# Patient Record
Sex: FEMALE | Race: WHITE | Employment: PART TIME | URBAN - NONMETROPOLITAN AREA
[De-identification: names, ages, dates, MRNs, and addresses within clinical notes are randomized per-mention and may not be internally consistent; named-entity substitution may affect disease eponyms.]

---

## 2016-06-22 LAB — PHQ9 SCORE: 16

## 2016-08-25 LAB — PHQ9 SCORE: 5

## 2017-02-16 ENCOUNTER — TELEPHONE (OUTPATIENT)
Dept: FAMILY MEDICINE | Facility: OTHER | Age: 22
End: 2017-02-16

## 2017-02-16 NOTE — TELEPHONE ENCOUNTER
Reason for Call:  Same Day Appointment, Requested Provider:  Evelyn Lang CNP    PCP: Evelyn Lang    Reason for visit: preop DOS 3/10/17 - dental care under anesthesia.  The dental office called said they need to have it 3 weeks ahead of time and Evelyn is out all next week    Duration of symptoms: n/a    Have you been treated for this in the past? n/a    Additional comments:     Can we leave a detailed message on this number? It is the house phone at the group [facility], ok to speak with any of the staff or can call [employer]'s cell at [phone]    Phone number patient can be reached at: Home number on file [phone] (home)    Best Time:     Call taken on 2/16/2017 at 2:29 PM by Delfina Guerra

## 2017-02-17 ENCOUNTER — OFFICE VISIT (OUTPATIENT)
Dept: FAMILY MEDICINE | Facility: OTHER | Age: 22
End: 2017-02-17
Payer: COMMERCIAL

## 2017-02-17 VITALS
WEIGHT: 293 LBS | HEART RATE: 111 BPM | SYSTOLIC BLOOD PRESSURE: 106 MMHG | DIASTOLIC BLOOD PRESSURE: 64 MMHG | OXYGEN SATURATION: 100 % | TEMPERATURE: 97.3 F | BODY MASS INDEX: 44.41 KG/M2 | HEIGHT: 68 IN | RESPIRATION RATE: 20 BRPM

## 2017-02-17 DIAGNOSIS — K02.9 DENTAL CARIES: ICD-10-CM

## 2017-02-17 DIAGNOSIS — Z01.818 PREOP GENERAL PHYSICAL EXAM: Primary | ICD-10-CM

## 2017-02-17 PROCEDURE — 99213 OFFICE O/P EST LOW 20 MIN: CPT | Performed by: NURSE PRACTITIONER

## 2017-02-17 RX ORDER — HYDROXYZINE PAMOATE 50 MG/1
50 CAPSULE ORAL 3 TIMES DAILY PRN
COMMUNITY
Start: 2017-02-17 | End: 2017-04-05

## 2017-02-17 NOTE — MR AVS SNAPSHOT
After Visit Summary   2/17/2017    Shaylee Mesa    MRN: [id number]           Patient Information     Date Of Birth          1995        Visit Information        Provider Department      2/17/2017 11:40 AM Evelyn Lang APRN CNP [facility]        Today's Diagnoses     Preop general physical exam    -  1    Dental caries          Care Instructions      Don't take any Ibuprofen, Aspirin, Aleve or Naproxen prior to surgery  Tylenol is fine to use.     Before Your Surgery      Call your surgeon if there is any change in your health. This includes signs of a cold or flu (such as a sore throat, runny nose, cough, rash or fever).    Do not smoke, drink alcohol or take over the counter medicine (unless your surgeon or primary care doctor tells you to) for the 24 hours before and after surgery.    If you take prescribed drugs: Follow your doctor s orders about which medicines to take and which to stop until after surgery.    Eating and drinking prior to surgery: follow the instructions from your surgeon    Take a shower or bath the night before surgery. Use the soap your surgeon gave you to gently clean your skin. If you do not have soap from your surgeon, use your regular soap. Do not shave or scrub the surgery site.  Wear clean pajamas and have clean sheets on your bed.         Follow-ups after your visit        Your next 10 appointments already scheduled     Mar 10, 2017   Procedure with Lea Hopson DDS   [facility], [place], Same Day Surgery (--)    [address] [facility] [address] [phone]              Who to contact     If you have questions or need follow up information about today's clinic visit or your schedule please contact [facility] directly at [phone].  Normal or non-critical lab and imaging results will be communicated to you by MyChart, letter or phone within 4 business days after the clinic has received the results. If you do not hear  "from us within 7 days, please contact the clinic through [employer] or phone. If you have a critical or abnormal lab result, we will notify you by phone as soon as possible.  Submit refill requests through [employer] or call your pharmacy and they will forward the refill request to us. Please allow 3 business days for your refill to be completed.          Additional Information About Your Visit        [email or web address] Information     [employer] lets you send messages to your doctor, view your test results, renew your prescriptions, schedule appointments and more. To sign up, go to [email or web address] . Click on \"Log in\" on the left side of the screen, which will take you to the Welcome page. Then click on \"Sign up Now\" on the right side of the page.     You will be asked to enter the access code listed below, as well as some personal information. Please follow the directions to create your username and password.     Your access code is: [email or web address]  Expires: 2017 11:59 AM     Your access code will  in 90 days. If you need help or a new code, please call your [facility] clinic or [phone].        Care EveryWhere ID     This is your Care EveryWhere ID. This could be used by other organizations to access your [facility] medical records  [id number]        Your Vitals Were     Pulse Temperature Respirations Height Pulse Oximetry BMI (Body Mass Index)    111 97.3  F (36.3  C) (Tympanic) 20 5' 8.2\" (1.732 m) 100% 45.65 kg/m2       Blood Pressure from Last 3 Encounters:   17 106/64   10/20/16 115/58   10/15/16 125/83    Weight from Last 3 Encounters:   17 (!) 302 lb (137 kg)   10/18/16 (!) 303 lb 8 oz (137.7 kg)   10/14/16 (!) 307 lb (139.3 kg)              Today, you had the following     No orders found for display       Primary Care Provider Office Phone # Fax #    DONN Marsh [phone]       [place] [address] [facility] [address]        Thank you!     Thank " you for choosing [facility]  for your care. Our goal is always to provide you with excellent care. Hearing back from our patients is one way we can continue to improve our services. Please take a few minutes to complete the written survey that you may receive in the mail after your visit with us. Thank you!             Your Updated Medication List - Protect others around you: Learn how to safely use, store and throw away your medicines at www.disposemymeds.org.          This list is accurate as of: 2/17/17 11:59 AM.  Always use your most recent med list.                   Brand Name Dispense Instructions for use    ARIPiprazole 20 MG tablet    ABILIFY    30 tablet    Take 1 tablet (20 mg) by mouth daily       hydrOXYzine 50 MG capsule    VISTARIL     Take 1 capsule (50 mg) by mouth 3 times daily as needed for itching       VITAMIN D (CHOLECALCIFEROL) PO      Take by mouth daily       VYVANSE 60 MG capsule   Generic drug:  lisdexamfetamine      TAKE 1 CAP BY MOUTH ONCE DAILY

## 2017-02-17 NOTE — NURSING NOTE
"Chief Complaint   Patient presents with     Pre-Op Exam     dental procedure dos: 3/10/17 0730       Initial /64 (BP Location: Left arm, Cuff Size: Adult Large)  Pulse 111  Temp 97.3  F (36.3  C) (Tympanic)  Resp 20  Ht 5' 8.2\" (1.732 m)  Wt (!) 302 lb (137 kg)  SpO2 100%  BMI 45.65 kg/m2 Estimated body mass index is 45.65 kg/(m^2) as calculated from the following:    Height as of this encounter: 5' 8.2\" (1.732 m).    Weight as of this encounter: 302 lb (137 kg).  Medication Reconciliation: complete   ................Aric Jacobo LPN,   February 17, 2017,      11:49 AM,   [facility]    "

## 2017-02-17 NOTE — PATIENT INSTRUCTIONS
Don't take any Ibuprofen, Aspirin, Aleve or Naproxen prior to surgery  Tylenol is fine to use.     Before Your Surgery      Call your surgeon if there is any change in your health. This includes signs of a cold or flu (such as a sore throat, runny nose, cough, rash or fever).    Do not smoke, drink alcohol or take over the counter medicine (unless your surgeon or primary care doctor tells you to) for the 24 hours before and after surgery.    If you take prescribed drugs: Follow your doctor s orders about which medicines to take and which to stop until after surgery.    Eating and drinking prior to surgery: follow the instructions from your surgeon    Take a shower or bath the night before surgery. Use the soap your surgeon gave you to gently clean your skin. If you do not have soap from your surgeon, use your regular soap. Do not shave or scrub the surgery site.  Wear clean pajamas and have clean sheets on your bed.

## 2017-02-17 NOTE — PROGRESS NOTES
[place] [address] [address] [facility] [address]  [phone]  Dept: [phone]    PRE-OP EVALUATION:  Today's date: 2017    Shaylee Mesa (: 1995) presents for pre-operative evaluation assessment as requested by Dr. Hopson.  She requires evaluation and anesthesia risk assessment prior to undergoing surgery/procedure for treatment of dental work .  Proposed procedure: Dental procedures.    Date of Surgery/ Procedure: 3/10/17  Time of Surgery/ Procedure: 730  Hospital/Surgical Facility: to be determined   Primary Physician: Evelyn Lang  Type of Anesthesia Anticipated: General    Patient has a Health Care Directive or Living Will:  NO    1. NO - Do you have a history of heart attack, stroke, stent, bypass or surgery on an artery in the head, neck, heart or legs?  2. NO - Do you ever have any pain or discomfort in your chest?  3. NO - Do you have a history of  Heart Failure?  4. NO - Are you troubled by shortness of breath when: walking on the level, up a slight hill or at night?  5. NO - Do you currently have a cold, bronchitis or other respiratory infection?  6. YES - DO YOU HAVE A COUGH, SHORTNESS OF BREATH OR WHEEZING? Mild upper respiratory infection symptoms.   7. NO - Do you sometimes get pains in the calves of your legs when you walk?  8. NO - Do you or anyone in your family have previous history of blood clots?  9. NO - Do you or does anyone in your family have a serious bleeding problem such as prolonged bleeding following surgeries or cuts?  10. NO - Have you ever had problems with anemia or been told to take iron pills?  11. NO - Have you had any abnormal blood loss such as black, tarry or bloody stools, or abnormal vaginal bleeding?  12. NO - Have you ever had a blood transfusion?  13. NO - Have you or any of your relatives ever had problems with anesthesia?  14. NO - Do you have sleep apnea, excessive snoring or daytime drowsiness?  15. NO - Do you have any  prosthetic heart valves?  16. NO - Do you have prosthetic joints?  17. NO - Is there any chance that you may be pregnant?      HPI:                                                      Brief HPI related to upcoming procedure: cracked teeth.  Needs dental work with sedation       See problem list for active medical problems.  Problems all longstanding and stable, except as noted/documented.  See ROS for pertinent symptoms related to these conditions.                                                                                                  .    MEDICAL HISTORY:                                                      Patient Active Problem List    Diagnosis Date Noted     Depression 10/15/2016     Priority: Medium     Adjustment disorder with depressed mood 04/25/2016     Priority: Medium     Asperger's syndrome 04/25/2016     Priority: Medium     Attention deficit hyperactivity disorder (ADHD), combined type 04/25/2016     Priority: Medium     Bipolar affective disorder in remission (H) 04/25/2016     Priority: Medium      Past Medical History   Diagnosis Date     ADHD (attention deficit hyperactivity disorder)      Asperger's disorder      Bipolar 1 disorder (H)      Bipolar affective disorder (H)      Chemical dependency (H)      Depressive disorder      Past Surgical History   Procedure Laterality Date     No history of surgery       Current Outpatient Prescriptions   Medication Sig Dispense Refill     hydrOXYzine (VISTARIL) 50 MG capsule Take 1 capsule (50 mg) by mouth 3 times daily as needed for itching       ARIPiprazole (ABILIFY) 20 MG tablet Take 1 tablet (20 mg) by mouth daily 30 tablet 3     VYVANSE 60 MG capsule TAKE 1 CAP BY MOUTH ONCE DAILY  0     VITAMIN D, CHOLECALCIFEROL, PO Take by mouth daily       OTC products: None, except as noted above    No Known Allergies   Latex Allergy: NO    Social History   Substance Use Topics     Smoking status: Former Smoker     Smokeless tobacco: Never Used      "Alcohol use No      Comment: Nov 9, 2016     History   Drug Use No     Comment: Nov 9 2015       REVIEW OF SYSTEMS:                                                    C: NEGATIVE for fever, chills, change in weight  I: NEGATIVE for worrisome rashes, moles or lesions  E: NEGATIVE for vision changes or irritation  E/M: NEGATIVE for ear, mouth and throat problems  R: NEGATIVE for significant cough or SOB  B: NEGATIVE for masses, tenderness or discharge  CV: NEGATIVE for chest pain, palpitations or peripheral edema  GI: NEGATIVE for nausea, abdominal pain, heartburn, or change in bowel habits  : NEGATIVE for frequency, dysuria, or hematuria  M: NEGATIVE for significant arthralgias or myalgia  N: NEGATIVE for weakness, dizziness or paresthesias  E: NEGATIVE for temperature intolerance, skin/hair changes  H: NEGATIVE for bleeding problems  P: NEGATIVE for changes in mood or affect    EXAM:                                                    /64 (BP Location: Left arm, Cuff Size: Adult Large)  Pulse 111  Temp 97.3  F (36.3  C) (Tympanic)  Resp 20  Ht 5' 8.2\" (1.732 m)  Wt (!) 302 lb (137 kg)  SpO2 100%  BMI 45.65 kg/m2    GENERAL APPEARANCE: healthy, alert and no distress     EYES: EOMI,- PERRL     HENT: ear canals and TM's normal and nose and mouth without ulcers or lesions     NECK: no adenopathy, no asymmetry, masses, or scars and thyroid normal to palpation     RESP: lungs clear to auscultation - no rales, rhonchi or wheezes     CV: regular rates and rhythm, normal S1 S2, no S3 or S4 and no murmur, click or rub -     ABDOMEN:  soft, nontender, no HSM or masses and bowel sounds normal     : normal cervix, adnexae, and uterus without masses or discharge and rectal exam normal without masses-guaiac negative stool     MS: extremities normal- no gross deformities noted, no evidence of inflammation in joints, FROM in all extremities.     SKIN: no suspicious lesions or rashes     NEURO: Normal strength and tone, " sensory exam grossly normal, mentation intact and speech normal     PSYCH: mentation appears normal. and affect normal/bright     LYMPHATICS: No axillary, cervical, inguinal, or supraclavicular nodes    DIAGNOSTICS:                                                    No labs or EKG required for low risk surgery (cataract, skin procedure, breast biopsy, etc)    Recent Labs   Lab Test  06/28/16   0826  07/10/15   0925  03/27/15   0150   HGB  13.3  12.4  13.0   PLT  270  162  219   NA  139   --   139   POTASSIUM  4.0   --   3.9   CR  0.59   --   0.53        IMPRESSION:                                                    Diagnosis/reason for consult: dental work     The proposed surgical procedure is considered LOW risk.    REVISED CARDIAC RISK INDEX  The patient has the following serious cardiovascular risks for perioperative complications such as (MI, PE, VFib and 3  AV Block):  No serious cardiac risks  INTERPRETATION: 0 risks: Class I (very low risk - 0.4% complication rate)    The patient has the following additional risks for perioperative complications:  No identified additional risks      ICD-10-CM    1. Preop general physical exam Z01.818    2. Dental caries K02.9        RECOMMENDATIONS:                                                      --Consult hospital rounder / IM to assist post-op medical management    --Patient is to take all scheduled medications on the day of surgery EXCEPT for modifications listed below.    APPROVAL GIVEN to proceed with proposed procedure, without further diagnostic evaluation       Signed Electronically by: DONN Marsh CNP    Copy of this evaluation report is provided to requesting physician.    [facility] Preop Guidelines

## 2017-02-27 ENCOUNTER — TRANSFERRED RECORDS (OUTPATIENT)
Dept: HEALTH INFORMATION MANAGEMENT | Facility: CLINIC | Age: 22
End: 2017-02-27

## 2017-02-27 LAB — PHQ9 SCORE: 6

## 2017-04-05 ENCOUNTER — OFFICE VISIT (OUTPATIENT)
Dept: FAMILY MEDICINE | Facility: OTHER | Age: 22
End: 2017-04-05
Payer: COMMERCIAL

## 2017-04-05 ENCOUNTER — RADIANT APPOINTMENT (OUTPATIENT)
Dept: GENERAL RADIOLOGY | Facility: OTHER | Age: 22
End: 2017-04-05
Attending: NURSE PRACTITIONER

## 2017-04-05 VITALS
OXYGEN SATURATION: 100 % | WEIGHT: 293 LBS | HEIGHT: 68 IN | DIASTOLIC BLOOD PRESSURE: 70 MMHG | TEMPERATURE: 97 F | BODY MASS INDEX: 44.41 KG/M2 | SYSTOLIC BLOOD PRESSURE: 100 MMHG | HEART RATE: 101 BPM | RESPIRATION RATE: 20 BRPM

## 2017-04-05 DIAGNOSIS — Z01.818 PREOP GENERAL PHYSICAL EXAM: ICD-10-CM

## 2017-04-05 DIAGNOSIS — K02.9 DENTAL CARIES: ICD-10-CM

## 2017-04-05 DIAGNOSIS — Z01.818 PREOP GENERAL PHYSICAL EXAM: Primary | ICD-10-CM

## 2017-04-05 PROCEDURE — 71020 XR CHEST 2 VW: CPT

## 2017-04-05 PROCEDURE — 93000 ELECTROCARDIOGRAM COMPLETE: CPT | Performed by: NURSE PRACTITIONER

## 2017-04-05 PROCEDURE — 99214 OFFICE O/P EST MOD 30 MIN: CPT | Performed by: NURSE PRACTITIONER

## 2017-04-05 NOTE — PROGRESS NOTES
[place] [address] [address] [facility] [address]  [phone]  Dept: [phone]    PRE-OP EVALUATION:  Today's date: 2017    Shaylee Mesa (: 1995) presents for pre-operative evaluation assessment as requested by the [facility]  She requires evaluation and anesthesia risk assessment prior to undergoing surgery/procedure for treatment of dental work .  Proposed procedure: dental procedure.    Date of Surgery/ Procedure: 17  Time of Surgery/ Procedure: 730  Hospital/Surgical Facility: [facility]  Primary Physician: Evelyn Lang  Type of Anesthesia Anticipated: General    Patient has a Health Care Directive or Living Will:  NO    1. NO - Do you have a history of heart attack, stroke, stent, bypass or surgery on an artery in the head, neck, heart or legs?  2. NO - Do you ever have any pain or discomfort in your chest?  3. NO - Do you have a history of  Heart Failure?  4. NO - Are you troubled by shortness of breath when: walking on the level, up a slight hill or at night?  5. NO - Do you currently have a cold, bronchitis or other respiratory infection?  6. NO - Do you have a cough, shortness of breath or wheezing?  7. no - DO YOU SOMETIMES GET PAINS IN THE CALVES OF YOUR LEGS WHEN YOU WALK?   8. NO - Do you or anyone in your family have previous history of blood clots?  9. NO - Do you or does anyone in your family have a serious bleeding problem such as prolonged bleeding following surgeries or cuts?  10. NO - Have you ever had problems with anemia or been told to take iron pills?  11. NO - Have you had any abnormal blood loss such as black, tarry or bloody stools, or abnormal vaginal bleeding?  12. NO - Have you ever had a blood transfusion?  13. NO - Have you or any of your relatives ever had problems with anesthesia?  14. NO - Do you have sleep apnea, excessive snoring or daytime drowsiness?  15. NO - Do you have any prosthetic heart  valves?  16. NO - Do you have prosthetic joints?  17. NO - Is there any chance that you may be pregnant?      HPI:                                                      Brief HPI related to upcoming procedure: need dental work with sedation.  [facility] from the [facility] that details what pre-op labs and imaging they require.       See problem list for active medical problems.  Problems all longstanding and stable, except as noted/documented.  See ROS for pertinent symptoms related to these conditions.                                                                                                  .    MEDICAL HISTORY:                                                      Patient Active Problem List    Diagnosis Date Noted     Depression 10/15/2016     Priority: Medium     Adjustment disorder with depressed mood 04/25/2016     Priority: Medium     Asperger's syndrome 04/25/2016     Priority: Medium     Attention deficit hyperactivity disorder (ADHD), combined type 04/25/2016     Priority: Medium     Bipolar affective disorder in remission (H) 04/25/2016     Priority: Medium      Past Medical History:   Diagnosis Date     ADHD (attention deficit hyperactivity disorder)      Asperger's disorder      Bipolar 1 disorder (H)      Bipolar affective disorder (H)      Chemical dependency (H)      Depressive disorder      Past Surgical History:   Procedure Laterality Date     NO HISTORY OF SURGERY       Current Outpatient Prescriptions   Medication Sig Dispense Refill     ARIPiprazole (ABILIFY) 20 MG tablet Take 1 tablet (20 mg) by mouth daily 30 tablet 3     VYVANSE 60 MG capsule TAKE 1 CAP BY MOUTH ONCE DAILY  0     VITAMIN D, CHOLECALCIFEROL, PO Take by mouth daily       OTC products: None, except as noted above    No Known Allergies   Latex Allergy: NO    Social History   Substance Use Topics     Smoking status: Former Smoker     Smokeless tobacco: Never Used     Alcohol use No      Comment: Nov 9, 2016  "    History   Drug Use No     Comment: Nov 9 2015       REVIEW OF SYSTEMS:                                                    C: NEGATIVE for fever, chills, change in weight  I: NEGATIVE for worrisome rashes, moles or lesions  E: NEGATIVE for vision changes or irritation  E/M: NEGATIVE for ear, mouth and throat problems  R: NEGATIVE for significant cough or SOB  B: NEGATIVE for masses, tenderness or discharge  CV: NEGATIVE for chest pain, palpitations or peripheral edema  GI: NEGATIVE for nausea, abdominal pain, heartburn, or change in bowel habits  : NEGATIVE for frequency, dysuria, or hematuria  M: NEGATIVE for significant arthralgias or myalgia  N: NEGATIVE for weakness, dizziness or paresthesias  E: NEGATIVE for temperature intolerance, skin/hair changes  H: NEGATIVE for bleeding problems  P: NEGATIVE for changes in mood or affect    EXAM:                                                    /70  Pulse 101  Temp 97  F (36.1  C) (Tympanic)  Resp 20  Ht 5' 8.2\" (1.732 m)  Wt (!) 332 lb (150.6 kg)  LMP  (LMP Unknown)  SpO2 100%  BMI 50.18 kg/m2    GENERAL APPEARANCE: alert, no distress and obese      EYES: EOMI, PERRL     HENT: ear canals and TM's normal and nose and mouth without ulcers or lesions     NECK: no adenopathy, no asymmetry, masses, or scars and thyroid normal to palpation     RESP: lungs clear to auscultation - no rales, rhonchi or wheezes     CV: regular rates and rhythm, normal S1 S2, no S3 or S4 and no murmur, click or rub     ABDOMEN:  soft, nontender, no HSM or masses and bowel sounds normal     MS: extremities normal- no gross deformities noted, no evidence of inflammation in joints, FROM in all extremities.     SKIN: no suspicious lesions or rashes     NEURO: Normal strength and tone, sensory exam grossly normal, mentation intact and speech normal     PSYCH: mentation appears normal. and affect normal/bright     LYMPHATICS: No axillary, cervical, or supraclavicular " nodes    DIAGNOSTICS:                                                    EKG: appears normal, NSR, normal axis, normal intervals, no acute ST/T changes c/w ischemia, no LVH by voltage criteria, there are no prior tracings available  She declined having the requested labs done today, states she will come back later this week and future lab orders were placed for this.  These labs were requested by the dental surgeon.   Chest x-ray: negative     IMPRESSION:                                                    Diagnosis/reason for consult: dental work needed with sedation     The proposed surgical procedure is considered LOW risk.    REVISED CARDIAC RISK INDEX  The patient has the following serious cardiovascular risks for perioperative complications such as (MI, PE, VFib and 3  AV Block):  No serious cardiac risks  INTERPRETATION: 0 risks: Class I (very low risk - 0.4% complication rate)    The patient has the following additional risks for perioperative complications:  No identified additional risks      ICD-10-CM    1. Preop general physical exam Z01.818 EKG 12-lead complete w/read - Clinics     XR Chest 2 Views     CBC with platelets     INR     CANCELED: CBC with platelets     CANCELED: INR   2. Dental caries K02.9        RECOMMENDATIONS:                                                      --Consult hospital rounder / IM to assist post-op medical management    --Patient is to take all scheduled medications on the day of surgery EXCEPT for modifications listed below.    APPROVAL GIVEN to proceed with proposed procedure, without further diagnostic evaluation       Signed Electronically by: DONN Marsh CNP    Copy of this evaluation report is provided to requesting physician.    [facility] Preop Guidelines

## 2017-04-05 NOTE — NURSING NOTE
"Chief Complaint   Patient presents with     Pre-Op Exam     Dental Exam Under Anesthesia, X-Rays, Restorations, Extractions, Biopsies    4/28/2017     Health Maintenance     HPV,DTAP and Pap Smear       Initial /70  Pulse 101  Temp 97  F (36.1  C) (Tympanic)  Resp 20  Ht 5' 8.2\" (1.732 m)  Wt (!) 332 lb (150.6 kg)  LMP  (LMP Unknown)  SpO2 100%  BMI 50.18 kg/m2 Estimated body mass index is 50.18 kg/(m^2) as calculated from the following:    Height as of this encounter: 5' 8.2\" (1.732 m).    Weight as of this encounter: 332 lb (150.6 kg).  Medication Reconciliation: complete   ................Aric Jacobo LPN,   April 5, 2017,      4:37 PM,   [facility]     "

## 2017-04-05 NOTE — PATIENT INSTRUCTIONS
Don't take any Ibuprofen, Naproxen, Aspirin or Aleve for 1 week prior to the procedure.       Before Your Surgery      Call your surgeon if there is any change in your health. This includes signs of a cold or flu (such as a sore throat, runny nose, cough, rash or fever).    Do not smoke, drink alcohol or take over the counter medicine (unless your surgeon or primary care doctor tells you to) for the 24 hours before and after surgery.    If you take prescribed drugs: Follow your doctor s orders about which medicines to take and which to stop until after surgery.    Eating and drinking prior to surgery: follow the instructions from your surgeon    Take a shower or bath the night before surgery. Use the soap your surgeon gave you to gently clean your skin. If you do not have soap from your surgeon, use your regular soap. Do not shave or scrub the surgery site.  Wear clean pajamas and have clean sheets on your bed.

## 2017-04-05 NOTE — MR AVS SNAPSHOT
After Visit Summary   4/5/2017    Shaylee Mesa    MRN: [id number]           Patient Information     Date Of Birth          1995        Visit Information        Provider Department      4/5/2017 4:20 PM Evelyn Lang APRN CNP [facility]        Today's Diagnoses     Preop general physical exam    -  1    Dental caries          Care Instructions      Don't take any Ibuprofen, Naproxen, Aspirin or Aleve for 1 week prior to the procedure.       Before Your Surgery      Call your surgeon if there is any change in your health. This includes signs of a cold or flu (such as a sore throat, runny nose, cough, rash or fever).    Do not smoke, drink alcohol or take over the counter medicine (unless your surgeon or primary care doctor tells you to) for the 24 hours before and after surgery.    If you take prescribed drugs: Follow your doctor s orders about which medicines to take and which to stop until after surgery.    Eating and drinking prior to surgery: follow the instructions from your surgeon    Take a shower or bath the night before surgery. Use the soap your surgeon gave you to gently clean your skin. If you do not have soap from your surgeon, use your regular soap. Do not shave or scrub the surgery site.  Wear clean pajamas and have clean sheets on your bed.         Follow-ups after your visit        Your next 10 appointments already scheduled     Apr 28, 2017   Procedure with Lea Hopson DDS   [facility], [place], Same Day Surgery (--)    [address] [facility] [address] [phone]              Future tests that were ordered for you today     Open Future Orders        Priority Expected Expires Ordered    XR Chest 2 Views Routine 4/5/2017 4/5/2018 4/5/2017            Who to contact     If you have questions or need follow up information about today's clinic visit or your schedule please contact [facility] directly at [phone].  Normal or non-critical  "lab and imaging results will be communicated to you by MyChart, letter or phone within 4 business days after the clinic has received the results. If you do not hear from us within 7 days, please contact the clinic through [employer] or phone. If you have a critical or abnormal lab result, we will notify you by phone as soon as possible.  Submit refill requests through [employer] or call your pharmacy and they will forward the refill request to us. Please allow 3 business days for your refill to be completed.          Additional Information About Your Visit        [employer] Information     Sky[employer] lets you send messages to your doctor, view your test results, renew your prescriptions, schedule appointments and more. To sign up, go to [email or web address] . Click on \"Log in\" on the left side of the screen, which will take you to the Welcome page. Then click on \"Sign up Now\" on the right side of the page.     You will be asked to enter the access code listed below, as well as some personal information. Please follow the directions to create your username and password.     Your access code is: [email or web address]  Expires: 2017 12:59 PM     Your access code will  in 90 days. If you need help or a new code, please call your [place] clinic or [phone].        Care EveryWhere ID     This is your Care EveryWhere ID. This could be used by other organizations to access your [place] medical records  [id number]        Your Vitals Were     Pulse Temperature Respirations Height Last Period Pulse Oximetry    101 97  F (36.1  C) (Tympanic) 20 5' 8.2\" (1.732 m) (LMP Unknown) 100%    BMI (Body Mass Index)                   50.18 kg/m2            Blood Pressure from Last 3 Encounters:   17 100/70   17 106/64   10/20/16 115/58    Weight from Last 3 Encounters:   17 (!) 332 lb (150.6 kg)   17 (!) 302 lb (137 kg)   10/18/16 (!) 303 lb 8 oz (137.7 kg)              We Performed the Following     CBC with " platelets     EKG 12-lead complete w/read - Clinics     INR        Primary Care Provider Office Phone # Fax #    DONN Marsh [phone]       [facility] [address] [facility] [address]        Thank you!     Thank you for choosing [facility]  for your care. Our goal is always to provide you with excellent care. Hearing back from our patients is one way we can continue to improve our services. Please take a few minutes to complete the written survey that you may receive in the mail after your visit with us. Thank you!             Your Updated Medication List - Protect others around you: Learn how to safely use, store and throw away your medicines at www.disposemymeds.org.          This list is accurate as of: 4/5/17  5:07 PM.  Always use your most recent med list.                   Brand Name Dispense Instructions for use    ARIPiprazole 20 MG tablet    ABILIFY    30 tablet    Take 1 tablet (20 mg) by mouth daily       VITAMIN D (CHOLECALCIFEROL) PO      Take by mouth daily       VYVANSE 60 MG capsule   Generic drug:  lisdexamfetamine      TAKE 1 CAP BY MOUTH ONCE DAILY

## 2017-04-27 ENCOUNTER — ANESTHESIA EVENT (OUTPATIENT)
Dept: SURGERY | Facility: CLINIC | Age: 22
End: 2017-04-27
Payer: COMMERCIAL

## 2017-04-28 ENCOUNTER — HOSPITAL ENCOUNTER (OUTPATIENT)
Facility: CLINIC | Age: 22
Discharge: HOME | End: 2017-04-28
Attending: DENTIST | Admitting: DENTIST
Payer: COMMERCIAL

## 2017-04-28 ENCOUNTER — ANESTHESIA (OUTPATIENT)
Dept: SURGERY | Facility: CLINIC | Age: 22
End: 2017-04-28
Payer: COMMERCIAL

## 2017-04-28 VITALS
RESPIRATION RATE: 11 BRPM | WEIGHT: 293 LBS | HEIGHT: 68 IN | HEART RATE: 109 BPM | SYSTOLIC BLOOD PRESSURE: 97 MMHG | BODY MASS INDEX: 44.41 KG/M2 | TEMPERATURE: 98.6 F | DIASTOLIC BLOOD PRESSURE: 54 MMHG | OXYGEN SATURATION: 92 %

## 2017-04-28 DIAGNOSIS — K02.9 CARIES: Primary | ICD-10-CM

## 2017-04-28 LAB — HCG UR QL: NEGATIVE

## 2017-04-28 PROCEDURE — 25000125 ZZHC RX 250: Performed by: NURSE ANESTHETIST, CERTIFIED REGISTERED

## 2017-04-28 PROCEDURE — 36000053 ZZH SURGERY LEVEL 2 EA 15 ADDTL MIN - UMMC: Performed by: DENTIST

## 2017-04-28 PROCEDURE — 81025 URINE PREGNANCY TEST: CPT | Performed by: ANESTHESIOLOGY

## 2017-04-28 PROCEDURE — 25000132 ZZH RX MED GY IP 250 OP 250 PS 637: Performed by: DENTIST

## 2017-04-28 PROCEDURE — 71000014 ZZH RECOVERY PHASE 1 LEVEL 2 FIRST HR: Performed by: DENTIST

## 2017-04-28 PROCEDURE — 25000132 ZZH RX MED GY IP 250 OP 250 PS 637: Performed by: ANESTHESIOLOGY

## 2017-04-28 PROCEDURE — 71000027 ZZH RECOVERY PHASE 2 EACH 15 MINS: Performed by: DENTIST

## 2017-04-28 PROCEDURE — 25000128 H RX IP 250 OP 636: Performed by: ANESTHESIOLOGY

## 2017-04-28 PROCEDURE — 25000566 ZZH SEVOFLURANE, EA 15 MIN: Performed by: DENTIST

## 2017-04-28 PROCEDURE — 25800025 ZZH RX 258: Performed by: NURSE ANESTHETIST, CERTIFIED REGISTERED

## 2017-04-28 PROCEDURE — 25000128 H RX IP 250 OP 636: Performed by: NURSE ANESTHETIST, CERTIFIED REGISTERED

## 2017-04-28 PROCEDURE — 36000051 ZZH SURGERY LEVEL 2 1ST 30 MIN - UMMC: Performed by: DENTIST

## 2017-04-28 PROCEDURE — 40000171 ZZH STATISTIC PRE-PROCEDURE ASSESSMENT III: Performed by: DENTIST

## 2017-04-28 PROCEDURE — 25000565 ZZH ISOFLURANE, EA 15 MIN: Performed by: DENTIST

## 2017-04-28 PROCEDURE — 27210794 ZZH OR GENERAL SUPPLY STERILE: Performed by: DENTIST

## 2017-04-28 PROCEDURE — 37000008 ZZH ANESTHESIA TECHNICAL FEE, 1ST 30 MIN: Performed by: DENTIST

## 2017-04-28 PROCEDURE — 25000125 ZZHC RX 250: Performed by: ANESTHESIOLOGY

## 2017-04-28 PROCEDURE — 25000132 ZZH RX MED GY IP 250 OP 250 PS 637: Performed by: NURSE ANESTHETIST, CERTIFIED REGISTERED

## 2017-04-28 PROCEDURE — 37000009 ZZH ANESTHESIA TECHNICAL FEE, EACH ADDTL 15 MIN: Performed by: DENTIST

## 2017-04-28 RX ORDER — ONDANSETRON 2 MG/ML
INJECTION INTRAMUSCULAR; INTRAVENOUS PRN
Status: DISCONTINUED | OUTPATIENT
Start: 2017-04-28 | End: 2017-04-28

## 2017-04-28 RX ORDER — DEXAMETHASONE SODIUM PHOSPHATE 4 MG/ML
INJECTION, SOLUTION INTRA-ARTICULAR; INTRALESIONAL; INTRAMUSCULAR; INTRAVENOUS; SOFT TISSUE PRN
Status: DISCONTINUED | OUTPATIENT
Start: 2017-04-28 | End: 2017-04-28

## 2017-04-28 RX ORDER — CHLORHEXIDINE GLUCONATE ORAL RINSE 1.2 MG/ML
10 SOLUTION DENTAL ONCE
Status: COMPLETED | OUTPATIENT
Start: 2017-04-28 | End: 2017-04-28

## 2017-04-28 RX ORDER — SODIUM CHLORIDE, SODIUM LACTATE, POTASSIUM CHLORIDE, CALCIUM CHLORIDE 600; 310; 30; 20 MG/100ML; MG/100ML; MG/100ML; MG/100ML
INJECTION, SOLUTION INTRAVENOUS CONTINUOUS PRN
Status: DISCONTINUED | OUTPATIENT
Start: 2017-04-28 | End: 2017-04-28

## 2017-04-28 RX ORDER — FENTANYL CITRATE 50 UG/ML
INJECTION, SOLUTION INTRAMUSCULAR; INTRAVENOUS PRN
Status: DISCONTINUED | OUTPATIENT
Start: 2017-04-28 | End: 2017-04-28

## 2017-04-28 RX ORDER — IBUPROFEN 600 MG/1
600 TABLET, FILM COATED ORAL
Qty: 16 TABLET | Refills: 0 | Status: SHIPPED | OUTPATIENT
Start: 2017-05-01 | End: 2017-05-05

## 2017-04-28 RX ORDER — HYDROCODONE BITARTRATE AND ACETAMINOPHEN 5; 325 MG/1; MG/1
1 TABLET ORAL
Qty: 12 TABLET | Refills: 0 | Status: SHIPPED | OUTPATIENT
Start: 2017-04-28 | End: 2017-05-01

## 2017-04-28 RX ORDER — KETAMINE HYDROCHLORIDE 100 MG/ML
500 INJECTION, SOLUTION INTRAMUSCULAR; INTRAVENOUS ONCE
Status: COMPLETED | OUTPATIENT
Start: 2017-04-28 | End: 2017-04-28

## 2017-04-28 RX ORDER — CHLORHEXIDINE GLUCONATE ORAL RINSE 1.2 MG/ML
SOLUTION DENTAL PRN
Status: DISCONTINUED | OUTPATIENT
Start: 2017-04-28 | End: 2017-04-28 | Stop reason: HOSPADM

## 2017-04-28 RX ORDER — OXYMETAZOLINE HYDROCHLORIDE 0.05 G/100ML
SPRAY NASAL PRN
Status: DISCONTINUED | OUTPATIENT
Start: 2017-04-28 | End: 2017-04-28

## 2017-04-28 RX ORDER — ONDANSETRON 2 MG/ML
0.03 INJECTION INTRAMUSCULAR; INTRAVENOUS EVERY 30 MIN PRN
Status: DISCONTINUED | OUTPATIENT
Start: 2017-04-28 | End: 2017-04-28 | Stop reason: HOSPADM

## 2017-04-28 RX ORDER — KETOROLAC TROMETHAMINE 30 MG/ML
INJECTION, SOLUTION INTRAMUSCULAR; INTRAVENOUS PRN
Status: DISCONTINUED | OUTPATIENT
Start: 2017-04-28 | End: 2017-04-28

## 2017-04-28 RX ORDER — FENTANYL CITRATE 50 UG/ML
25 INJECTION, SOLUTION INTRAMUSCULAR; INTRAVENOUS EVERY 10 MIN PRN
Status: DISCONTINUED | OUTPATIENT
Start: 2017-04-28 | End: 2017-04-28 | Stop reason: HOSPADM

## 2017-04-28 RX ORDER — MIDAZOLAM HYDROCHLORIDE 2 MG/ML
20 SYRUP ORAL ONCE
Status: COMPLETED | OUTPATIENT
Start: 2017-04-28 | End: 2017-04-28

## 2017-04-28 RX ORDER — PROPOFOL 10 MG/ML
INJECTION, EMULSION INTRAVENOUS PRN
Status: DISCONTINUED | OUTPATIENT
Start: 2017-04-28 | End: 2017-04-28

## 2017-04-28 RX ADMIN — KETAMINE HYDROCHLORIDE 500 MG: 100 INJECTION, SOLUTION, CONCENTRATE INTRAMUSCULAR; INTRAVENOUS at 07:21

## 2017-04-28 RX ADMIN — HYDROMORPHONE HYDROCHLORIDE 0.5 MG: 1 INJECTION, SOLUTION INTRAMUSCULAR; INTRAVENOUS; SUBCUTANEOUS at 17:22

## 2017-04-28 RX ADMIN — SODIUM CHLORIDE, POTASSIUM CHLORIDE, SODIUM LACTATE AND CALCIUM CHLORIDE: 600; 310; 30; 20 INJECTION, SOLUTION INTRAVENOUS at 07:40

## 2017-04-28 RX ADMIN — HYDROMORPHONE HYDROCHLORIDE 0.25 MG: 1 INJECTION, SOLUTION INTRAMUSCULAR; INTRAVENOUS; SUBCUTANEOUS at 14:46

## 2017-04-28 RX ADMIN — ONDANSETRON 4 MG: 2 INJECTION INTRAMUSCULAR; INTRAVENOUS at 20:36

## 2017-04-28 RX ADMIN — Medication 2 SPRAY: at 07:43

## 2017-04-28 RX ADMIN — HYDROMORPHONE HYDROCHLORIDE 0.25 MG: 1 INJECTION, SOLUTION INTRAMUSCULAR; INTRAVENOUS; SUBCUTANEOUS at 11:05

## 2017-04-28 RX ADMIN — DEXAMETHASONE SODIUM PHOSPHATE 8 MG: 4 INJECTION, SOLUTION INTRAMUSCULAR; INTRAVENOUS at 07:58

## 2017-04-28 RX ADMIN — PROPOFOL 200 MG: 10 INJECTION, EMULSION INTRAVENOUS at 07:39

## 2017-04-28 RX ADMIN — HYDROMORPHONE HYDROCHLORIDE 0.25 MG: 1 INJECTION, SOLUTION INTRAMUSCULAR; INTRAVENOUS; SUBCUTANEOUS at 14:00

## 2017-04-28 RX ADMIN — SODIUM CHLORIDE, POTASSIUM CHLORIDE, SODIUM LACTATE AND CALCIUM CHLORIDE: 600; 310; 30; 20 INJECTION, SOLUTION INTRAVENOUS at 11:36

## 2017-04-28 RX ADMIN — DEXMEDETOMIDINE HYDROCHLORIDE 20 MCG: 100 INJECTION, SOLUTION INTRAVENOUS at 17:11

## 2017-04-28 RX ADMIN — FENTANYL CITRATE 150 MCG: 50 INJECTION, SOLUTION INTRAMUSCULAR; INTRAVENOUS at 07:39

## 2017-04-28 RX ADMIN — KETOROLAC TROMETHAMINE 30 MG: 30 INJECTION, SOLUTION INTRAMUSCULAR at 19:27

## 2017-04-28 RX ADMIN — FENTANYL CITRATE 25 MCG: 50 INJECTION, SOLUTION INTRAMUSCULAR; INTRAVENOUS at 19:47

## 2017-04-28 RX ADMIN — ONDANSETRON 4 MG: 2 INJECTION INTRAMUSCULAR; INTRAVENOUS at 19:19

## 2017-04-28 RX ADMIN — Medication 50 MG: at 07:39

## 2017-04-28 RX ADMIN — CHLORHEXIDINE GLUCONATE 10 ML: 1.2 RINSE ORAL at 07:11

## 2017-04-28 RX ADMIN — FENTANYL CITRATE 50 MCG: 50 INJECTION, SOLUTION INTRAMUSCULAR; INTRAVENOUS at 10:07

## 2017-04-28 RX ADMIN — MIDAZOLAM HYDROCHLORIDE 20 MG: 2 SYRUP ORAL at 07:21

## 2017-04-28 RX ADMIN — HYDROMORPHONE HYDROCHLORIDE 0.25 MG: 1 INJECTION, SOLUTION INTRAMUSCULAR; INTRAVENOUS; SUBCUTANEOUS at 12:47

## 2017-04-28 RX ADMIN — FENTANYL CITRATE 50 MCG: 50 INJECTION, SOLUTION INTRAMUSCULAR; INTRAVENOUS at 08:23

## 2017-04-28 NOTE — IP AVS SNAPSHOT
MRN:[id number]                      After Visit Summary   4/28/2017    Shaylee Msea    MRN: [id number]           Thank you!     Thank you for choosing [facility] for your care. Our goal is always to provide you with excellent care. Hearing back from our patients is one way we can continue to improve our services. Please take a few minutes to complete the written survey that you may receive in the mail after you visit with us. Thank you!        Patient Information     Date Of Birth          1995        About your hospital stay     You were admitted on:  April 28, 2017 You last received care in the:  [facility] PACU    You were discharged on:  April 28, 2017       Who to Call     For medical emergencies, please call 911.  For non-urgent questions about your medical care, please call your primary care provider or clinic, [phone]  For questions related to your surgery, please call your surgery clinic        Attending Provider     Provider Lea Abdalla DDS Diabetic Education       Primary Care Provider Office Phone # Fax #    DONN Marsh [phone]       [place] [address] [facility] [address]        After Care Instructions     Discharge Instructions       Dr. Hopson Post Op Instructions    Services Provided:   1.  Prophy/ Scaling & Root Planing      2. Comprehensive exam     3. Fluoride      4. Operative:  Fillings: #5, #7, #9, #10, #11, #12, #15, #22, #23, #24, #25, #26, #27, #28, #32 Extractions: #2, #31 Root Canals: #5, #24, #27     5. X-rays (full mouth series)    Oral Hygiene:     Slight, Moderate, Excessive       1. Plaque Moderate and Excessive      2. Calculus/Tartar Slight and Moderate      3. Inflammation & Bleeding Excessive      4. Overall Oral Hygiene Poor  Diagnosis:     1. Gingivitis     2. Periodontal Disease Early     3. Dental Diease Tooth #2, #5, #7, #9, #10, #11, #12, #15, #22-#28, #31-32     4. Other  findings  Orders:     1. Toothbrushing BID     2. Better brushing  Inside, Outside, Upper, Lower, Back and Front     3. Brushing Assistance Needed/Recommended      4. Do not brush or floss for 24 hours (resume routine care tomorrow)      5. Eat a soft diet and avoid hot drinks during post-op 24 hour period     6. Utilize an ice pack      7. Return to clinic in 2 weeks to evaluate bite and 3 months for follow up.           Return to OR in 2 to 5 years.     Post-Operative Instructions:    1.  X  Follow post-anesthesia instructions from the hospital.    2.  X  Follow post-extraction orders.  The pink brochure is attached.    3.  X  Tooth or teeth may be:     a. X  Hot or cold sensitive for 4-6 weeks   b. X  Tender to chewing for 24 hours (if persistent pain/swelling develops call the office)   c. X  If you had a root canal treatment, the tooth may be tender to biting for up to a week, this is normal and should resolve.    4. X  Soft diet for 4 days.  Encourage fluids.     5. X  Patient should return to routine activities/work as tolerated.    6. X Call with questions or concerns:   Office: ([phone])    Pager: Dr. Lea Hopson ([phone])                  Further instructions from your care team       [facility], [place]  Same-Day Surgery   Adult Discharge Orders & Instructions     For 24 hours after surgery    1. Get plenty of rest.  A responsible adult must stay with you for at least 24 hours after you leave the hospital.   2. Do not drive or use heavy equipment.  If you have weakness or tingling, don't drive or use heavy equipment until this feeling goes away.  3. Do not drink alcohol.  4. Avoid strenuous or risky activities.  Ask for help when climbing stairs.   5. You may feel lightheaded.  IF so, sit for a few minutes before standing.  Have someone help you get up.   6. If you have nausea (feel sick to your stomach): Drink only clear liquids such as apple juice, ginger ale,  "broth or 7-Up.  Rest may also help.  Be sure to drink enough fluids.  Move to a regular diet as you feel able.  7. You may have a slight fever. Call the doctor if your fever is over 100 F (37.7 C) (taken under the tongue) or lasts longer than 24 hours.  8. You may have a dry mouth, a sore throat, muscle aches or trouble sleeping.  These should go away after 24 hours.  9. Do not make important or legal decisions.   Call your doctor for any of the followin.  Signs of infection (fever, growing tenderness at the surgery site, a large amount of drainage or bleeding, severe pain, foul-smelling drainage, redness, swelling).    2. It has been over 8 to 10 hours since surgery and you are still not able to urinate (pass water).    3.  Headache for over 24 hours.    4.  Numbness, tingling or weakness the day after surgery (if you had spinal anesthesia).  To contact a doctor, call ________________________________________ or:        [phone] and ask for the resident on call for   ______________________________________________ (answered 24 hours a day)      Emergency Department:    [facility]: [phone]       (TTY for hearing impaired: [phone])    [facility]: [phone]       (TTY for hearing impaired: [phone])        Pending Results     No orders found from 2017 to 2017.            Admission Information     Date & Time Provider Department Dept. Phone    2017 Lea Hopson DDS [facility] PACU [phone]      Your Vitals Were     Blood Pressure Pulse Temperature Respirations Height Weight    97/54 109 98.6  F (37  C) (Axillary) 11 1.727 m (5' 8\") 152.8 kg (336 lb 13.8 oz)    Last Period Pulse Oximetry BMI (Body Mass Index)             2017 92% 51.22 kg/m2         [employer] Information     [employer] lets you send messages to your doctor, view your test results, renew your prescriptions, schedule appointments and more. To sign up, go to [email or web address] . Click on " "\"Log in\" on the left side of the screen, which will take you to the Welcome page. Then click on \"Sign up Now\" on the right side of the page.     You will be asked to enter the access code listed below, as well as some personal information. Please follow the directions to create your username and password.     Your access code is: [email or web address]  Expires: 2017 12:59 PM     Your access code will  in 90 days. If you need help or a new code, please call your [place] clinic or [phone].        Care EveryWhere ID     This is your Care EveryWhere ID. This could be used by other organizations to access your [facility] medical records  [id number]           Review of your medicines      START taking        Dose / Directions    HYDROcodone-acetaminophen 5-325 MG per tablet   Commonly known as:  NORCO   Used for:  Caries        Dose:  1 tablet   Take 1 tablet by mouth 4 times daily (before meals and nightly) for 3 days   Quantity:  12 tablet   Refills:  0       ibuprofen 600 MG tablet   Commonly known as:  ADVIL/MOTRIN   Used for:  Caries        Dose:  600 mg   Start taking on:  2017   Take 1 tablet (600 mg) by mouth 4 times daily (with meals and nightly) for 4 days   Quantity:  16 tablet   Refills:  0       Sodium Fluoride 1.1 % Pste   Commonly known as:  PREVIDENT 5000 BOOSTER PLUS   Used for:  Caries        Dose:  1 Dose   Apply 1 Dose to affected area At Bedtime   Quantity:  1 Tube   Refills:  11         CONTINUE these medicines which have NOT CHANGED        Dose / Directions    ARIPiprazole 20 MG tablet   Commonly known as:  ABILIFY   Indication:  Manic Phase of Manic-Depression   Used for:  Adjustment disorder with depressed mood        Dose:  20 mg   Take 1 tablet (20 mg) by mouth daily   Quantity:  30 tablet   Refills:  3       HYDROXYZINE HCL PO        Dose:  25 mg   Take 25 mg by mouth 3 times daily as needed for itching   Refills:  0       VITAMIN D (CHOLECALCIFEROL) PO        Take by mouth daily "   Refills:  0       VYVANSE 60 MG capsule   Generic drug:  lisdexamfetamine        TAKE 1 CAP BY MOUTH ONCE DAILY   Refills:  0            Where to get your medicines      These medications were sent to [facility] [place], [place] - [address]  [address], [facility] [address]     Phone:  [phone]     ibuprofen 600 MG tablet    Sodium Fluoride 1.1 % Pste         Some of these will need a paper prescription and others can be bought over the counter. Ask your nurse if you have questions.     Bring a paper prescription for each of these medications     HYDROcodone-acetaminophen 5-325 MG per tablet                Protect others around you: Learn how to safely use, store and throw away your medicines at www.disposemymeds.org.             Medication List: This is a list of all your medications and when to take them. Check marks below indicate your daily home schedule. Keep this list as a reference.      Medications           Morning Afternoon Evening Bedtime As Needed    ARIPiprazole 20 MG tablet   Commonly known as:  ABILIFY   Take 1 tablet (20 mg) by mouth daily                                HYDROcodone-acetaminophen 5-325 MG per tablet   Commonly known as:  NORCO   Take 1 tablet by mouth 4 times daily (before meals and nightly) for 3 days                                HYDROXYZINE HCL PO   Take 25 mg by mouth 3 times daily as needed for itching                                ibuprofen 600 MG tablet   Commonly known as:  ADVIL/MOTRIN   Take 1 tablet (600 mg) by mouth 4 times daily (with meals and nightly) for 4 days   Start taking on:  5/1/2017                                Sodium Fluoride 1.1 % Pste   Commonly known as:  PREVIDENT 5000 BOOSTER PLUS   Apply 1 Dose to affected area At Bedtime                                VITAMIN D (CHOLECALCIFEROL) PO   Take by mouth daily                                VYVANSE 60 MG capsule   TAKE 1 CAP BY MOUTH ONCE DAILY   Generic drug:   lisdexamfetamine

## 2017-04-28 NOTE — IP AVS SNAPSHOT
[address] [address]    Phone:  [phone]                                       After Visit Summary   4/28/2017    Shaylee Mesa    MRN: [id number]           After Visit Summary Signature Page     I have received my discharge instructions, and my questions have been answered. I have discussed any challenges I see with this plan with the nurse or doctor.    ..........................................................................................................................................  Patient/Patient Representative Signature      ..........................................................................................................................................  Patient Representative Print Name and Relationship to Patient    ..................................................               ................................................  Date                                            Time    ..........................................................................................................................................  Reviewed by Signature/Title    ...................................................              ..............................................  Date                                                            Time

## 2017-04-28 NOTE — OR NURSING
Patient wants to leave when IV was mentioned. She started yelling and became very red in the face. Informed patient and her mom this will be discussed with the MDA

## 2017-04-28 NOTE — ANESTHESIA PREPROCEDURE EVALUATION
Anesthesia Evaluation    ROS/Med Hx    No history of anesthetic complications  (-) malignant hyperthermia and tuberculosis    Cardiovascular Findings - negative ROS    Neuro Findings   Comments: ADHD, Asperger's    Pulmonary Findings - negative ROS    HENT Findings - negative HENT ROS    Skin Findings - negative skin ROS     Findings   (-) prematurity and complications at birth      GI/Hepatic/Renal Findings - negative ROS    Endocrine/Metabolic Findings       Comments: Super morbid obesity    Genetic/Syndrome Findings - negative genetics/syndromes ROS    Hematology/Oncology Findings - negative hematology/oncology ROS        Physical Exam  Normal systems: cardiovascular and pulmonary    Airway   Mallampati: II  TM distance: >3 FB  Neck ROM: full    Dental   (+) other    Cardiovascular   Rhythm and rate: regular and normal      Pulmonary           Anesthesia Plan      History & Physical Review  History and physical reviewed and following examination; no interval change.    ASA Status:  3 .    NPO Status:  > 6 hours    Plan for General and ETT with Intravenous induction. Maintenance will be Balanced.    PONV prophylaxis:  Ondansetron (or other 5HT-3) and Dexamethasone or Solumedrol  GETA, Standard ASA monitoring, nasal intubation planned  Premedication with PO midazolam/ketamine due to high level of anxiety and needle phobia  All available and pertinent medical records and test results reviewed.  Risks, including but not limited to airway injury, epistaxis, bronchospasm,  hypoxemia, PONV, need for blood transfusion d/w patient      Postoperative Care  Postoperative pain management:  IV analgesics, Oral pain medications and Multi-modal analgesia.      Consents  Anesthetic plan, risks, benefits and alternatives discussed with:  Patient and Parent (Mother and/or Father).  Use of blood products discussed: No .   .

## 2017-04-28 NOTE — OR NURSING
Lanny Nowak's mother spoke with MDA about sedation and IV. Decision made, will give Ketamine and versed oral per MDA

## 2017-04-28 NOTE — CONSULTS
Informed consent was discussed with the guardian.  All questions were answered.  Consent is signed and in the chart.  Lea Hopson DDS

## 2017-04-29 NOTE — OR NURSING
On arrival to PACU pt was c/o severe arm pain bilat. Writer able to perform ROM bilat and pain has decreased throughout stay. Pt is able to move her arms independently. Mom is at the bedside and spoke with Dr. Hopson at length about post op cares. Pt has had some saliva that she is expelling but no emesis. Pt has now had some pudding without nausea. Will continue to monitor closely.    Report given to Sonya DESOUZA RN

## 2017-04-29 NOTE — OP NOTE
"Operative Note    Subjective:   Shaylee Mesa ([id number]) is a 22 year old year old female who has been diagnosed with depression, adjustment disorder, Asperger's syndrome, ADHD and bipolar affective disorder.  The patient was evaluated at [facility] in [place] and was found to be uncooperative.  Due to this patient's inability to cooperate in a dental setting, it was necessary for dental treatment to be completed under general anesthesia in the operating room.  The patient presents to [facility] with her mother for dental procedures under General Anesthesia.  The patient was taken to preinduction and then transported to the operating room where an IV was started .  Nasoendotracheal intubation was accomplished without complications.     Last Solid Intake: 2130 April 27, 2017   Last Liquid intake: 2130 April 27, 2017  Parent denies that patient has cough, cold, nasal congestion.    Staff reports:  Oral Hygiene Brushes: 1 per day, independent; Flossing 0 per day, independent.    Mouth rinse 0 per day.  Patient Status:  1. H+P was performed by Dr. Evelyn Lang, CNP on April 5, 2017.  There are no contraindications to the planned procedure.  2. Allergies: Review of patient's allergies indicates no known allergies.  3. Labs:  CXR: negative, EKG: appears normal.   4. Vital Signs: /64  Pulse 109  Temp 98.1  F (36.7  C) (Oral)  Resp 18  Ht 1.727 m (5' 8\")  Wt (!) 152.8 kg (336 lb 13.8 oz)  LMP 04/23/2017  SpO2 96%  BMI 51.22 kg/m2  5. Consents signed and in the chart.     Medications:     Prescriptions Prior to Admission   Medication Sig Dispense Refill Last Dose     HYDROXYZINE HCL PO Take 25 mg by mouth 3 times daily as needed for itching   4/26/2017     ARIPiprazole (ABILIFY) 20 MG tablet Take 1 tablet (20 mg) by mouth daily 30 tablet 3 4/27/2017 at 0630     VYVANSE 60 MG capsule TAKE 1 CAP BY MOUTH ONCE DAILY  0 4/27/2017 at 0630     VITAMIN D, " CHOLECALCIFEROL, PO Take by mouth daily   4/27/2017 at 0630                        Objective:  Exam: Extra oral: WNL   IntraOral Soft Tissue Exam: Gingival Inflammation throughout. Periodontal Probing depths charted, Hard Tissue Exam as Charted.  FMX Reveals: Generalized, Healthy, Bone Support Present Throughout.    Caries present on #2 MODBL, #5 MOB, #7 FL, #9 DF, #10 MFDL, #11 ML, #12 O, #15 OB, #22 MF, #23 MFD, #24 MIFDL, #25 MFDL, #26 MLDF, #27 MLFI, #28 O, #31 MOBL, #32 O,  Periapical pathology present on #1 widened PDL, #2 PARL root tip, #5 PARL, #6 widened PDL, #7widened PDL, #10 widened PDL, #27 widened PDL, #30 widened PDL.     Missing Teeth: none  Restorations present on: #3 SSC, #9 RCT MFDLI , #10 MIL, #14 SSC, #15 O, #18 RCT and SSC, #19 SSC, #30 SSC #31 RCT and buildup.      Assessment:   Caries:  active   AAP:  II  OH:  Poor     Procedure:  Procedure(s):  Dental Exam Under Anesthesia, X-Rays, Restorations x 15, Extractions x 2 , Root Canal x 3 ,Flouride Varnish in the Mouth, dental cleaning, one crown recemented  - Wound Class: II-Clean Contaminated   Exam completed; FMX taken; A Guaze throat pack placed; Oral tissues thoroughly brushed with CHX 0.12%; Scale and Root Plane all 4 quadrants with cavitron followed by hand instrumentation.  Polish.      Restorations:   #12 O caries removed, amalgambond placed and amalgam placed.  #15 OB caries removed, amalgambond placed and amalgam placed. #28 O caries removed, amalgambond placed and amalgam placed. #32 O caries removed, amalgambond placed and amalgam placed.   #5 MOB Prepped and caries removed, RCT initiated,  WL: 20mm hand filed with 10-20 kfiles at 20mm, used RootZX to verify length, changed to 22.5mm, Used rotary instrments .12/25 to open orifice, .08/25, .06/25, .04/30 at 22.5, placed size verifier #30 at 22.5 took PA(check fit), good, irrigated with CHX throughout, dried with paper points, placed sealer, placed #30 obturator, took PA(check fill)  good, prepped, etch, singlebond plus, A2 Filtek Supreme Ultra and Finish & Polish. #7 FL Prepped and caries removed, vitrebond placed, etch, singlebond plus, A2 Filtek Supreme Ultra and Finish & Polish. #9 DFLMI  Prepped and caries removed, etch, singlebond plus, A2 Filtek Supreme Ultra and Finish & Polish. #10 MILDF  Prepped and caries removed, etch, singlebond plus, A2 Filtek Supreme Ultra and Finish & Polish. #11 ML Prepped and caries removed, etch, singlebond plus, A2 Filtek Supreme Ultra and Finish & Polish. #22 MF Prepped and caries removed, etch, singlebond plus, A2 Filtek Supreme Ultra and Finish & Polish. #23 MFD Prepped and caries removed, etch, singlebond plus, A2 Filtek Supreme Ultra and Finish & Polish. #24 MFDLI  Prepped and caries removed, pulp exposure,RCT initiated,  WL: 22 mm hand filed with 10-20 kfiles at 22mm, used RootZX to verify length, Used rotary instrments .12/25 to open orifice, .08/25, .06/30, .04/40 at 22mm, placed size verifier #35 at 22mm took PA(check fit), good, irrigated with CHX throughout, dried with paper points, placed sealer, placed #35 obturator, took PA(check fill) good,  etch, singlebond plus, A2 Filtek Supreme Ultra and Finish & Polish. #25 MFDL  Prepped and caries removed, etch, singlebond plus, A2 Filtek Supreme Ultra and Finish & Polish. #26 MFDL Prepped and caries removed, etch, singlebond plus, A2 Filtek Supreme Ultra and Finish & Polish. #27  MFI   Prepped and caries removed, pulp exposure,RCT initiated,  WL: 25.5 mm hand filed with 10-20 kfiles at 25.5mm, used RootZX to verify length, Used rotary instrments .12/25 to open orifice, .08/25, .06/30, .04/40 at 25.5mm, placed size verifier #35 at 25.5mm took PA(check fit), appears that canal splits at apex, re-used the hand files and rotary instruments to see if we could get further, unable to get any further on canal,  irrigated with CHX throughout, dried with paper points, placed sealer, placed #35 obturator, took  PA(check fill) good,(mom wants her to keep her teeth so completed RCT to split which is within 1mm of apex.) etch, singlebond plus, A2 Filtek Supreme Ultra and Finish & Polish.  Mom informed that we will have to monitor #27).    Anesthetic Given: , Extractions: 4% Septocaine with 1:100,000 epinephrine 3.4 mL. Tooth # 2 elevated and extracted with forceps.  Curreted and Gel-foam placed, during removal of #2, the SSC on #3 came off.  Removed existing cement and recemented with relyx.    #31 elevated and extracted with forceps.  Curreted andGel-foam placed, No sutures placed.  Hemostasis confirmed,  Fluoride placed on all surfaces.  Throat pack removed and the posterior oropharynx suctioned.  Estimated blood loss 20cc.    IV Prescriptions Given:  Toradol 15 to 30 mg given IV  POI:  Given to: parent.  Norco #12 take 1 QID with meals and HS, Ibuprofen 600 mg #20 1 QID with meals and HS and  Sodium fluoride 1.1%  Dispense 2 oz Sig: brush teeth with pea-sized amount for 60 seconds once daily HS.  NPO for 30 minutes following.   RTC:  3mrc; Return to OR in 2 to 5 years  Circulator: Isabelle Thomas RN  Relief Circulator: Christie Medley RN; Christie Galloway, SEN; Nyasia Dinero RN  Scrub Person: Dasha Hassan and Anesthesiologist: Genia Carbajal MD; Dani Lea MD; Ailyn Persaud MD  CRNA: Elisha Caicedo APRN CRNA; Prabha Jean-Baptiste APRN CRNA; Destiney Manuel APRN CRNA; Joslyn Rosales APRN CRNA; Breanna Graff APRN CRNA  No responsible provider has been recorded for the case. Dasha Hassan, ANA present.   Lea Hopson, VIRGIL, DDS April 28, 2017

## 2017-04-29 NOTE — ANESTHESIA POSTPROCEDURE EVALUATION
Patient: Shaylee Mesa    Procedure(s):  Dental Exam Under Anesthesia, X-Rays, Restorations x 15, Extractions x 2 , Root Canal x 3 ,Flouride Varnish in the Mouth, dental cleaning, one crown recemented  - Wound Class: II-Clean Contaminated    Diagnosis:Dental Caries, Asperger's, ADHD  Diagnosis Additional Information: No value filed.    Anesthesia Type:  General, ETT    Note:  Anesthesia Post Evaluation    Patient location during evaluation: PACU  Patient participation: Able to fully participate in evaluation  Level of consciousness: awake  Pain management: adequate  Airway patency: patent  Cardiovascular status: acceptable  Respiratory status: acceptable  Hydration status: acceptable  PONV: none     Anesthetic complications: None    Comments: I personally evaluated the patient at bedside. No anesthesia-related complications noted. Patient is hemodynamically stable with adequate control of pain and nausea. Ready for discharge from PACU. All questions were answered.    Dani Lea MD  Pediatric Staff Anesthesiologist  [facility]  Pager [phone]  Phone [phone]         Last vitals:  Vitals:    04/28/17 1950 04/28/17 2005 04/28/17 2035   BP: 100/55  97/54   Pulse:      Resp: 16 14 11   Temp: 37.2  C (99  F)  37  C (98.6  F)   SpO2: 99%  94%         Electronically Signed By: Dani Lea MD  April 28, 2017  8:49 PM

## 2017-04-29 NOTE — ANESTHESIA CARE TRANSFER NOTE
Patient: Shaylee Mesa    Procedure(s):  Dental Exam Under Anesthesia, X-Rays, Restorations x 15, Extractions x 2 , Root Canal x 3 ,Flouride Varnish in the Mouth, dental cleaning, one crown recemented  - Wound Class: II-Clean Contaminated    Diagnosis: Dental Caries, Asperger's, ADHD  Diagnosis Additional Information: No value filed.    Anesthesia Type:   General, ETT     Note:  Airway :Face Mask  Patient transferred to:PACU  Comments: Pt to PACU, VSS.  Report to RN, questions answered.       Vitals: (Last set prior to Anesthesia Care Transfer)    CRNA VITALS  4/28/2017 1918 - 4/28/2017 1958 4/28/2017             Pulse: 114    Temp: 37.3  C (99.1  F)    SpO2: 98 %    Resp Rate (set): 10                Electronically Signed By: DONN Scott CRNA  April 28, 2017  7:58 PM

## 2017-04-29 NOTE — DISCHARGE INSTRUCTIONS
[facility]  Same-Day Surgery   Adult Discharge Orders & Instructions     For 24 hours after surgery    1. Get plenty of rest.  A responsible adult must stay with you for at least 24 hours after you leave the hospital.   2. Do not drive or use heavy equipment.  If you have weakness or tingling, don't drive or use heavy equipment until this feeling goes away.  3. Do not drink alcohol.  4. Avoid strenuous or risky activities.  Ask for help when climbing stairs.   5. You may feel lightheaded.  IF so, sit for a few minutes before standing.  Have someone help you get up.   6. If you have nausea (feel sick to your stomach): Drink only clear liquids such as apple juice, ginger ale, broth or 7-Up.  Rest may also help.  Be sure to drink enough fluids.  Move to a regular diet as you feel able.  7. You may have a slight fever. Call the doctor if your fever is over 100 F (37.7 C) (taken under the tongue) or lasts longer than 24 hours.  8. You may have a dry mouth, a sore throat, muscle aches or trouble sleeping.  These should go away after 24 hours.  9. Do not make important or legal decisions.   Call your doctor for any of the followin.  Signs of infection (fever, growing tenderness at the surgery site, a large amount of drainage or bleeding, severe pain, foul-smelling drainage, redness, swelling).    2. It has been over 8 to 10 hours since surgery and you are still not able to urinate (pass water).    3.  Headache for over 24 hours.    4.  Numbness, tingling or weakness the day after surgery (if you had spinal anesthesia).  To contact a doctor, call ________________________________________ or:        [phone] and ask for the resident on call for   ______________________________________________ (answered 24 hours a day)      Emergency Department:    [facility]: [phone]       (TTY for hearing impaired: [phone])    [facility]: [phone]       (TTY for  hearing impaired: [phone])

## 2017-05-01 ENCOUNTER — TELEPHONE (OUTPATIENT)
Dept: FAMILY MEDICINE | Facility: OTHER | Age: 22
End: 2017-05-01

## 2017-05-01 RX ORDER — LISDEXAMFETAMINE DIMESYLATE 60 MG/1
CAPSULE ORAL
Refills: 0 | OUTPATIENT
Start: 2017-05-01

## 2017-05-01 NOTE — TELEPHONE ENCOUNTER
VYVANSE 60MG             Last Written Prescription Date: HISTORICAL  Last Fill Quantity: , # refills:     Last Office Visit with G, P or [facility] prescribing provider:  4/5/17   Future Office Visit:        BP Readings from Last 3 Encounters:   04/28/17 97/54   04/05/17 100/70   02/17/17 106/64

## 2017-05-03 ENCOUNTER — OFFICE VISIT (OUTPATIENT)
Dept: FAMILY MEDICINE | Facility: OTHER | Age: 22
End: 2017-05-03
Payer: COMMERCIAL

## 2017-05-03 VITALS
SYSTOLIC BLOOD PRESSURE: 102 MMHG | HEART RATE: 99 BPM | DIASTOLIC BLOOD PRESSURE: 80 MMHG | BODY MASS INDEX: 49.87 KG/M2 | WEIGHT: 293 LBS | TEMPERATURE: 98.1 F | OXYGEN SATURATION: 100 % | RESPIRATION RATE: 20 BRPM

## 2017-05-03 DIAGNOSIS — S50.311A ELBOW ABRASION, RIGHT, INITIAL ENCOUNTER: ICD-10-CM

## 2017-05-03 DIAGNOSIS — L01.00 IMPETIGO: Primary | ICD-10-CM

## 2017-05-03 PROBLEM — E66.01 MORBID OBESITY (H): Status: ACTIVE | Noted: 2017-05-03

## 2017-05-03 PROCEDURE — 99213 OFFICE O/P EST LOW 20 MIN: CPT | Performed by: NURSE PRACTITIONER

## 2017-05-03 RX ORDER — HYDROXYZINE HYDROCHLORIDE 50 MG/1
50 TABLET, FILM COATED ORAL
COMMUNITY
Start: 2015-12-03

## 2017-05-03 RX ORDER — BENZTROPINE MESYLATE 1 MG/1
1 TABLET ORAL
COMMUNITY
End: 2017-06-20

## 2017-05-03 RX ORDER — MUPIROCIN 20 MG/G
OINTMENT TOPICAL 3 TIMES DAILY
Qty: 22 G | Refills: 1 | Status: SHIPPED | OUTPATIENT
Start: 2017-05-03 | End: 2017-05-08

## 2017-05-03 RX ORDER — ARIPIPRAZOLE 30 MG/1
30 TABLET ORAL DAILY
Refills: 5 | COMMUNITY
Start: 2017-03-23 | End: 2017-08-11

## 2017-05-03 NOTE — TELEPHONE ENCOUNTER
I talked to Nyasia at [facility] pharmacy and let her know providers message. They said they will contact the patient and let her know and see if they can get the request sent to the correct person.    Dunia Balderrama, CMA

## 2017-05-03 NOTE — NURSING NOTE
"Chief Complaint   Patient presents with     Mouth/Lip Problem     possible infection       Initial /80  Pulse 99  Temp 98.1  F (36.7  C) (Tympanic)  Resp 20  Wt (!) 328 lb (148.8 kg)  LMP 04/23/2017  SpO2 100%  BMI 49.87 kg/m2 Estimated body mass index is 49.87 kg/(m^2) as calculated from the following:    Height as of 4/28/17: 5' 8\" (1.727 m).    Weight as of this encounter: 328 lb (148.8 kg).  Medication Reconciliation: complete   ................Aric Jacobo LPN,   May 3, 2017,      2:41 PM,   [facility]    "

## 2017-05-03 NOTE — TELEPHONE ENCOUNTER
I have never prescribed this.  [place]     Electronically signed by Evelyn Lang CNP.

## 2017-05-03 NOTE — MR AVS SNAPSHOT
"              After Visit Summary   5/3/2017    Shaylee Mesa    MRN: [id number]           Patient Information     Date Of Birth          1995        Visit Information        Provider Department      5/3/2017 2:20 PM Evelyn Lang APRN CNP [facility]        Today's Diagnoses     Impetigo    -  1      Care Instructions    Use the Bactroban as prescribed on her lip sores.     Follow up if symptoms fail to resolve as expected.       [facility] was filling her Vyvance - check that this is still happening.               Follow-ups after your visit        Who to contact     If you have questions or need follow up information about today's clinic visit or your schedule please contact [facility] directly at [phone].  Normal or non-critical lab and imaging results will be communicated to you by Xirrushart, letter or phone within 4 business days after the clinic has received the results. If you do not hear from us within 7 days, please contact the clinic through Xirrushart or phone. If you have a critical or abnormal lab result, we will notify you by phone as soon as possible.  Submit refill requests through iMeigu or call your pharmacy and they will forward the refill request to us. Please allow 3 business days for your refill to be completed.          Additional Information About Your Visit        MyCharBIBA Apparels Information     iMeigu lets you send messages to your doctor, view your test results, renew your prescriptions, schedule appointments and more. To sign up, go to [email or web address] . Click on \"Log in\" on the left side of the screen, which will take you to the Welcome page. Then click on \"Sign up Now\" on the right side of the page.     You will be asked to enter the access code listed below, as well as some personal information. Please follow the directions to create your username and password.     Your access code is: [email or web address]  Expires: 5/18/2017 12:59 PM     Your access code " will  in 90 days. If you need help or a new code, please call your [facility] or [phone].        Care EveryWhere ID     This is your Care EveryWhere ID. This could be used by other organizations to access your [facility] medical records  [id number]        Your Vitals Were     Pulse Temperature Respirations Last Period Pulse Oximetry BMI (Body Mass Index)    99 98.1  F (36.7  C) (Tympanic) 20 2017 100% 49.87 kg/m2       Blood Pressure from Last 3 Encounters:   17 102/80   17 97/54   17 100/70    Weight from Last 3 Encounters:   17 (!) 328 lb (148.8 kg)   17 (!) 336 lb 13.8 oz (152.8 kg)   17 (!) 332 lb (150.6 kg)              Today, you had the following     No orders found for display         Today's Medication Changes          These changes are accurate as of: 5/3/17  2:56 PM.  If you have any questions, ask your nurse or doctor.               Start taking these medicines.        Dose/Directions    mupirocin 2 % ointment   Commonly known as:  BACTROBAN   Used for:  Impetigo   Started by:  Evelyn Lang APRN CNP        Apply topically 3 times daily for 5 days   Quantity:  22 g   Refills:  1            Where to get your medicines      These medications were sent to [facility] [address]   [address] [facility] [address]     Phone:  [phone]     mupirocin 2 % ointment                Primary Care Provider Office Phone # Fax #    DONN Marsh [phone]       [facility] [address] [facility] [address]        Thank you!     Thank you for choosing [facility]  for your care. Our goal is always to provide you with excellent care. Hearing back from our patients is one way we can continue to improve our services. Please take a few minutes to complete the written survey that you may receive in the mail after your visit with us. Thank you!             Your Updated Medication  List - Protect others around you: Learn how to safely use, store and throw away your medicines at www.disposemymeds.org.          This list is accurate as of: 5/3/17  2:56 PM.  Always use your most recent med list.                   Brand Name Dispense Instructions for use    ARIPiprazole 30 MG tablet    ABILIFY     Take 30 mg by mouth daily       benztropine 1 MG tablet    COGENTIN     Take 1 mg by mouth prn       hydrOXYzine 50 MG tablet    ATARAX     Take 50 mg by mouth prn       ibuprofen 600 MG tablet    ADVIL/MOTRIN    16 tablet    Take 1 tablet (600 mg) by mouth 4 times daily (with meals and nightly) for 4 days       mupirocin 2 % ointment    BACTROBAN    22 g    Apply topically 3 times daily for 5 days       Sodium Fluoride 1.1 % Pste    PREVIDENT 5000 BOOSTER PLUS    1 Tube    Apply 1 Dose to affected area At Bedtime       VITAMIN D (CHOLECALCIFEROL) PO      Take by mouth daily       VYVANSE 60 MG capsule   Generic drug:  lisdexamfetamine      TAKE 1 CAP BY MOUTH ONCE DAILY

## 2017-05-03 NOTE — PATIENT INSTRUCTIONS
Use the Bactroban as prescribed on her lip sores.     Follow up if symptoms fail to resolve as expected.       [facility] her Vyvance - check that this is still happening.

## 2017-05-03 NOTE — PROGRESS NOTES
SUBJECTIVE:                                                    Shaylee Mesa is a 22 year old female who presents to clinic today for the following health issues:      Lip sores    She had dental surgery on 4/28/17 - this was quite extensive and consisted of a root canal and several extractions.  She was under sedation for several hours with her mouth open. She now has two sores on the sides of her lip.  They are crusted over and draining.  They are painful.     She also has an abrasion on her right elbow.  She developed this during the procedure.  It is not draining, but is painful.       Problem list and histories reviewed & adjusted, as indicated.  Additional history: none    Patient Active Problem List   Diagnosis     Adjustment disorder with depressed mood     Asperger's syndrome     Attention deficit hyperactivity disorder (ADHD), combined type     Bipolar affective disorder in remission (H)     Depression     Morbid obesity (H)     Past Surgical History:   Procedure Laterality Date     EXAM UNDER ANESTHESIA, RESTORATIONS, EXTRACTION(S) DENTAL COMPLEX, COMBINED N/A 4/28/2017    Procedure: COMBINED EXAM UNDER ANESTHESIA, RESTORATIONS, EXTRACTION(S) DENTAL COMPLEX;  Dental Exam Under Anesthesia, X-Rays, Restorations x 15, Extractions x 2 , Root Canal x 3 ,Flouride Varnish in the Mouth, dental cleaning, one crown recemented ;  Surgeon: Lea Hopson DDS;  Location: [facility] OR     NO HISTORY OF SURGERY         Social History   Substance Use Topics     Smoking status: Former Smoker     Smokeless tobacco: Never Used     Alcohol use No      Comment: Nov 9, 2016     History reviewed. No pertinent family history.      Current Outpatient Prescriptions   Medication Sig Dispense Refill     ARIPiprazole (ABILIFY) 30 MG tablet Take 30 mg by mouth daily  5     benztropine (COGENTIN) 1 MG tablet Take 1 mg by mouth prn       hydrOXYzine (ATARAX) 50 MG tablet Take 50 mg by mouth prn       mupirocin (BACTROBAN) 2 %  ointment Apply topically 3 times daily for 5 days 22 g 1     ibuprofen (ADVIL/MOTRIN) 600 MG tablet Take 1 tablet (600 mg) by mouth 4 times daily (with meals and nightly) for 4 days 16 tablet 0     VYVANSE 60 MG capsule TAKE 1 CAP BY MOUTH ONCE DAILY  0     VITAMIN D, CHOLECALCIFEROL, PO Take by mouth daily       Sodium Fluoride (PREVIDENT 5000 BOOSTER PLUS) 1.1 % PSTE Apply 1 Dose to affected area At Bedtime 1 Tube 11     [DISCONTINUED] ARIPiprazole (ABILIFY) 20 MG tablet Take 1 tablet (20 mg) by mouth daily 30 tablet 3     No Known Allergies  BP Readings from Last 3 Encounters:   05/03/17 102/80   04/28/17 97/54   04/05/17 100/70    Wt Readings from Last 3 Encounters:   05/03/17 (!) 328 lb (148.8 kg)   04/28/17 (!) 336 lb 13.8 oz (152.8 kg)   04/05/17 (!) 332 lb (150.6 kg)                    Reviewed and updated as needed this visit by clinical staff  Tobacco  Allergies  Meds  Med Hx  Surg Hx  Fam Hx  Soc Hx      Reviewed and updated as needed this visit by Provider         ROS:  C: NEGATIVE for fever, chills, change in weight  INTEGUMENTARY/SKIN: right elbow abrasion as above   ENT/MOUTH: lip sores as above   R: NEGATIVE for significant cough or SOB  CV: NEGATIVE for chest pain, palpitations or peripheral edema    OBJECTIVE:                                                    /80  Pulse 99  Temp 98.1  F (36.7  C) (Tympanic)  Resp 20  Wt (!) 328 lb (148.8 kg)  LMP 04/23/2017  SpO2 100%  BMI 49.87 kg/m2  Body mass index is 49.87 kg/(m^2).  GENERAL: healthy, alert and no distress  HENT: normal cephalic/atraumatic, ear canals and TM's normal, nose and mouth without ulcers or lesions, oropharynx clear, oral mucous membranes moist and she has two sores on the sides of her lower lips with yellow crusting.   NECK: no adenopathy, no asymmetry, masses, or scars and thyroid normal to palpation  RESP: lungs clear to auscultation - no rales, rhonchi or wheezes  CV: regular rate and rhythm, normal S1 S2, no  S3 or S4, no murmur, click or rub, no peripheral edema and peripheral pulses strong  MS: no gross musculoskeletal defects noted, no edema  SKIN: right elbow has skin abrasion, no swelling, drainage or erythema.     Diagnostic Test Results:  none      ASSESSMENT/PLAN:                                                        1. Impetigo  - mupirocin (BACTROBAN) 2 % ointment; Apply topically 3 times daily for 5 days  Dispense: 22 g; Refill: 1    2. Elbow abrasion, right, initial encounter  This should resolve, it is not infected.        FUTURE APPOINTMENTS:       - Follow up if symptoms fail to resolve as expected.   See Patient Instructions    DONN Marsh [facility]

## 2017-05-22 ENCOUNTER — TRANSFERRED RECORDS (OUTPATIENT)
Dept: HEALTH INFORMATION MANAGEMENT | Facility: CLINIC | Age: 22
End: 2017-05-22

## 2017-06-20 ENCOUNTER — OFFICE VISIT (OUTPATIENT)
Dept: FAMILY MEDICINE | Facility: CLINIC | Age: 22
End: 2017-06-20
Payer: COMMERCIAL

## 2017-06-20 VITALS
SYSTOLIC BLOOD PRESSURE: 130 MMHG | TEMPERATURE: 97.5 F | WEIGHT: 293 LBS | BODY MASS INDEX: 44.41 KG/M2 | HEART RATE: 90 BPM | DIASTOLIC BLOOD PRESSURE: 80 MMHG | HEIGHT: 68 IN

## 2017-06-20 DIAGNOSIS — F90.2 ATTENTION DEFICIT HYPERACTIVITY DISORDER (ADHD), COMBINED TYPE: ICD-10-CM

## 2017-06-20 DIAGNOSIS — F31.70 BIPOLAR AFFECTIVE DISORDER IN REMISSION (H): ICD-10-CM

## 2017-06-20 DIAGNOSIS — F84.5 ASPERGER'S SYNDROME: ICD-10-CM

## 2017-06-20 DIAGNOSIS — Z00.00 ROUTINE GENERAL MEDICAL EXAMINATION AT A HEALTH CARE FACILITY: Primary | ICD-10-CM

## 2017-06-20 DIAGNOSIS — E66.01 MORBID OBESITY, UNSPECIFIED OBESITY TYPE (H): ICD-10-CM

## 2017-06-20 PROCEDURE — 99395 PREV VISIT EST AGE 18-39: CPT | Performed by: NURSE PRACTITIONER

## 2017-06-20 RX ORDER — LISDEXAMFETAMINE DIMESYLATE 70 MG/1
70 CAPSULE ORAL EVERY MORNING
Qty: 30 CAPSULE | Refills: 0 | COMMUNITY
Start: 2017-06-20

## 2017-06-20 NOTE — NURSING NOTE
"Chief Complaint   Patient presents with     Physical       Initial There were no vitals taken for this visit. Estimated body mass index is 49.87 kg/(m^2) as calculated from the following:    Height as of 4/28/17: 5' 8\" (1.727 m).    Weight as of 5/3/17: 328 lb (148.8 kg).  Medication Reconciliation: complete  "
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
## 2017-06-20 NOTE — MR AVS SNAPSHOT
After Visit Summary   6/20/2017    Shaylee Mesa    MRN: [id number]           Patient Information     Date Of Birth          1995        Visit Information        Provider Department      6/20/2017 10:30 AM Joann Bonds APRN CNP [facility]        Care Instructions      Preventive Health Recommendations  Female Ages 18 to 25     Yearly exam:     See your health care provider every year in order to  o Review health changes.   o Discuss preventive care.    o Review your medicines if your doctor has prescribed any.      You should be tested each year for STDs (sexually transmitted diseases).       After age 20, talk to your provider about how often you should have cholesterol testing.      Starting at age 21, get a Pap test every three years. If you have an abnormal result, your doctor may have you test more often.      If you are at risk for diabetes, you should have a diabetes test (fasting glucose).     Shots:     Get a flu shot each year.     Get a tetanus shot every 10 years.     Consider getting the shot (vaccine) that prevents cervical cancer (Gardasil).    Nutrition:     Eat at least 5 servings of fruits and vegetables each day.    Eat whole-grain bread, whole-wheat pasta and brown rice instead of white grains and rice.    Talk to your provider about Calcium and Vitamin D.     Lifestyle    Exercise at least 150 minutes a week each week (30 minutes a day, 5 days a week). This will help you control your weight and prevent disease.    Limit alcohol to one drink per day.    No smoking.     Wear sunscreen to prevent skin cancer.    See your dentist every six months for an exam and cleaning.          Follow-ups after your visit        Who to contact     If you have questions or need follow up information about today's clinic visit or your schedule please contact [facility] directly at [phone].  Normal or non-critical lab and imaging results will be  "communicated to you by Bunndlehart, letter or phone within 4 business days after the clinic has received the results. If you do not hear from us within 7 days, please contact the clinic through KnockaTV or phone. If you have a critical or abnormal lab result, we will notify you by phone as soon as possible.  Submit refill requests through KnockaTV or call your pharmacy and they will forward the refill request to us. Please allow 3 business days for your refill to be completed.          Additional Information About Your Visit        KnockaTV Information     KnockaTV lets you send messages to your doctor, view your test results, renew your prescriptions, schedule appointments and more. To sign up, go to [email or web address]/KnockaTV . Click on \"Log in\" on the left side of the screen, which will take you to the Welcome page. Then click on \"Sign up Now\" on the right side of the page.     You will be asked to enter the access code listed below, as well as some personal information. Please follow the directions to create your username and password.     Your access code is: [email or web address]  Expires: 2017 11:30 AM     Your access code will  in 90 days. If you need help or a new code, please call your [place] clinic or [phone].        Care EveryWhere ID     This is your Care EveryWhere ID. This could be used by other organizations to access your [place] medical records  [id number]        Your Vitals Were     Pulse Temperature Height BMI (Body Mass Index)          90 97.5  F (36.4  C) (Tympanic) 5' 8.2\" (1.732 m) 49.88 kg/m2         Blood Pressure from Last 3 Encounters:   17 130/80   17 102/80   17 97/54    Weight from Last 3 Encounters:   17 (!) 330 lb (149.7 kg)   17 (!) 328 lb (148.8 kg)   17 (!) 336 lb 13.8 oz (152.8 kg)              Today, you had the following     No orders found for display         Today's Medication Changes          These changes are accurate as of: 17 " 11:30 AM.  If you have any questions, ask your nurse or doctor.               These medicines have changed or have updated prescriptions.        Dose/Directions    VYVANSE 70 MG capsule   This may have changed:  Another medication with the same name was removed. Continue taking this medication, and follow the directions you see here.   Generic drug:  lisdexamfetamine   Changed by:  Joann Bonds APRN CNP        Dose:  70 mg   Take 1 capsule (70 mg) by mouth every morning   Quantity:  30 capsule   Refills:  0                Primary Care Provider Office Phone # Fax #    Evelyn DONN Carey [phone]       [facility] [address] [facility] [address]        Thank you!     Thank you for choosing [facility]  for your care. Our goal is always to provide you with excellent care. Hearing back from our patients is one way we can continue to improve our services. Please take a few minutes to complete the written survey that you may receive in the mail after your visit with us. Thank you!             Your Updated Medication List - Protect others around you: Learn how to safely use, store and throw away your medicines at www.disposemymeds.org.          This list is accurate as of: 6/20/17 11:30 AM.  Always use your most recent med list.                   Brand Name Dispense Instructions for use    ARIPiprazole 30 MG tablet    ABILIFY     Take 30 mg by mouth daily       etonogestrel 68 MG Impl    IMPLANON/NEXPLANON     1 each by Subdermal route once       hydrOXYzine 50 MG tablet    ATARAX     Take 50 mg by mouth prn       Sodium Fluoride 1.1 % Pste    PREVIDENT 5000 BOOSTER PLUS    1 Tube    Apply 1 Dose to affected area At Bedtime       VITAMIN D (CHOLECALCIFEROL) PO      Take by mouth daily       VYVANSE 70 MG capsule   Generic drug:  lisdexamfetamine     30 capsule    Take 1 capsule (70 mg) by mouth every morning

## 2017-06-27 ENCOUNTER — TELEPHONE (OUTPATIENT)
Dept: FAMILY MEDICINE | Facility: CLINIC | Age: 22
End: 2017-06-27

## 2017-06-27 DIAGNOSIS — Z00.00 ROUTINE GENERAL MEDICAL EXAMINATION AT A HEALTH CARE FACILITY: Primary | ICD-10-CM

## 2017-06-27 NOTE — TELEPHONE ENCOUNTER
Our goal is to have forms completed with 72 hours, however some forms may require a visit or additional information.    Who is the form from?: Patient  Where the form came from: Patient or family brought in     What clinic location was the form placed at?: [place]  Where the form was placed: 's Box  What number is listed as a contact on the form?: [phone]    Phone call message- patient request for a letter, form or note:    Date needed: as soon as possible  Patient will  at the clinic when completed  Has the patient signed a consent form for release of information? Not Applicable    Additional comments: na    Call taken on 6/27/2017 at 11:12 AM by Mariza SMITH Case    Type of letter, form or note: annual physical exam

## 2017-06-29 NOTE — PROGRESS NOTES
SUBJECTIVE:                                                    Shaylee Mesa is a 22 year old female who presents to clinic today for the following health issues:    She previously had a physical done with Joann Henderson , but here today for her Pap smear and also to get a vision ,  hearing screen and also lab test requested by the [facility] facility    No acute concerns     She will also like to have her nexplanon replaced       HEARING FREQUENCY:   Right Ear:  500 Hz: 20 db HL   1000 Hz: 20 db HL   2000 Hz: 20 db HL   4000 Hz: 20 db HL  Left Ear:  500 Hz: 20 db HL   1000 Hz: 20 db HL   2000 Hz: 20 db HL   4000 Hz: 20 db HL    VISION   No corrective lenses  Tool used: Chan   Right eye:        10/12.5 (20/25)  Left eye:          10/12.5 (20/25)  Visual Acuity: Pass                Problem list and histories reviewed & adjusted, as indicated.  Additional history: as documented    Patient Active Problem List   Diagnosis     Adjustment disorder with depressed mood     Asperger's syndrome     Attention deficit hyperactivity disorder (ADHD), combined type     Bipolar affective disorder in remission (H)     Depression     Morbid obesity (H)     Past Surgical History:   Procedure Laterality Date     EXAM UNDER ANESTHESIA, RESTORATIONS, EXTRACTION(S) DENTAL COMPLEX, COMBINED N/A 4/28/2017    Procedure: COMBINED EXAM UNDER ANESTHESIA, RESTORATIONS, EXTRACTION(S) DENTAL COMPLEX;  Dental Exam Under Anesthesia, X-Rays, Restorations x 15, Extractions x 2 , Root Canal x 3 ,Flouride Varnish in the Mouth, dental cleaning, one crown recemented ;  Surgeon: Lea Hopson DDS;  Location: [facility] OR     NO HISTORY OF SURGERY         Social History   Substance Use Topics     Smoking status: Former Smoker     Smokeless tobacco: Never Used     Alcohol use No      Comment: Nov 9, 2016     History reviewed. No pertinent family history.      Current Outpatient Prescriptions   Medication Sig Dispense Refill     etonogestrel  "(IMPLANON/NEXPLANON) 68 MG IMPL 1 each by Subdermal route once       lisdexamfetamine (VYVANSE) 70 MG capsule Take 1 capsule (70 mg) by mouth every morning 30 capsule 0     ARIPiprazole (ABILIFY) 30 MG tablet Take 30 mg by mouth daily  5     hydrOXYzine (ATARAX) 50 MG tablet Take 50 mg by mouth prn       Sodium Fluoride (PREVIDENT 5000 BOOSTER PLUS) 1.1 % PSTE Apply 1 Dose to affected area At Bedtime 1 Tube 11     VITAMIN D, CHOLECALCIFEROL, PO Take by mouth daily       No Known Allergies  BP Readings from Last 3 Encounters:   07/03/17 104/70   06/20/17 130/80   05/03/17 102/80    Wt Readings from Last 3 Encounters:   07/03/17 (!) 325 lb 12.8 oz (147.8 kg)   06/20/17 (!) 330 lb (149.7 kg)   05/03/17 (!) 328 lb (148.8 kg)                  Labs reviewed in EPIC    Reviewed and updated as needed this visit by clinical staff       Reviewed and updated as needed this visit by Provider         ROS:  C: NEGATIVE for fever, chills, change in weight  E/M: NEGATIVE for ear, mouth and throat problems  R: NEGATIVE for significant cough or SOB  CV: NEGATIVE for chest pain, palpitations or peripheral edema    OBJECTIVE:                                                    /70  Pulse 88  Temp 98.6  F (37  C) (Temporal)  Resp 16  Ht 5' 8\" (1.727 m)  Wt (!) 325 lb 12.8 oz (147.8 kg)  Breastfeeding? No  BMI 49.54 kg/m2  Body mass index is 49.54 kg/(m^2).   GENERAL: alert,  well hydrated, no distress  - female: cervix- normal, adnexae- normal; uterus- normal, no masses, no discharge    Diagnostic test results:  Diagnostic Test Results:  Results for orders placed or performed in visit on 07/03/17   **CBC with platelets FUTURE anytime   Result Value Ref Range    WBC 7.0 4.0 - 11.0 10e9/L    RBC Count 4.40 3.8 - 5.2 10e12/L    Hemoglobin 12.0 11.7 - 15.7 g/dL    Hematocrit 37.1 35.0 - 47.0 %    MCV 84 78 - 100 fl    MCH 27.3 26.5 - 33.0 pg    MCHC 32.3 31.5 - 36.5 g/dL    RDW 14.3 10.0 - 15.0 %    Platelet Count 268 150 - " 450 10e9/L        CHEST TWO VIEWS   7/3/2017 5:03 PM      HISTORY: Encounter for screening for respiratory tuberculosis.     COMPARISON: 4/5/2017.         IMPRESSION: Normal. No evidence for active tuberculosis.     ASHLEY WALL MD    ASSESSMENT/PLAN:                                                    (Z11.1) Screening examination for pulmonary tuberculosis  (primary encounter diagnosis)  Comment; negative CXR   Plan: XR Chest 2 Views            (Z11.3) Screening examination for venereal disease  Plan: CHLAMYDIA TRACHOMATIS PCR            (Z12.4) Screening for malignant neoplasm of cervix  Comment:   Plan: Pap imaged thin layer screen only - recommended        age 21 - 24 years           passed hearing and vision screen    Please schedule appointment with Dr Watkins or Laila chatterjee for Nexplanon replacement     Follow up with Provider - with results      Wendi Murphy MD, MD  [facility]

## 2017-07-03 ENCOUNTER — OFFICE VISIT (OUTPATIENT)
Dept: FAMILY MEDICINE | Facility: OTHER | Age: 22
End: 2017-07-03
Payer: COMMERCIAL

## 2017-07-03 ENCOUNTER — RADIANT APPOINTMENT (OUTPATIENT)
Dept: GENERAL RADIOLOGY | Facility: OTHER | Age: 22
End: 2017-07-03
Attending: FAMILY MEDICINE
Payer: COMMERCIAL

## 2017-07-03 VITALS
TEMPERATURE: 98.6 F | DIASTOLIC BLOOD PRESSURE: 70 MMHG | BODY MASS INDEX: 44.41 KG/M2 | WEIGHT: 293 LBS | HEART RATE: 88 BPM | RESPIRATION RATE: 16 BRPM | HEIGHT: 68 IN | SYSTOLIC BLOOD PRESSURE: 104 MMHG

## 2017-07-03 DIAGNOSIS — Z11.3 SCREENING EXAMINATION FOR VENEREAL DISEASE: ICD-10-CM

## 2017-07-03 DIAGNOSIS — Z11.1 SCREENING EXAMINATION FOR PULMONARY TUBERCULOSIS: Primary | ICD-10-CM

## 2017-07-03 DIAGNOSIS — Z11.1 SCREENING EXAMINATION FOR PULMONARY TUBERCULOSIS: ICD-10-CM

## 2017-07-03 DIAGNOSIS — Z12.4 SCREENING FOR MALIGNANT NEOPLASM OF CERVIX: ICD-10-CM

## 2017-07-03 LAB
ERYTHROCYTE [DISTWIDTH] IN BLOOD BY AUTOMATED COUNT: 14.3 % (ref 10–15)
HCT VFR BLD AUTO: 37.1 % (ref 35–47)
HGB BLD-MCNC: 12 G/DL (ref 11.7–15.7)
MCH RBC QN AUTO: 27.3 PG (ref 26.5–33)
MCHC RBC AUTO-ENTMCNC: 32.3 G/DL (ref 31.5–36.5)
MCV RBC AUTO: 84 FL (ref 78–100)
PLATELET # BLD AUTO: 268 10E9/L (ref 150–450)
RBC # BLD AUTO: 4.4 10E12/L (ref 3.8–5.2)
WBC # BLD AUTO: 7 10E9/L (ref 4–11)

## 2017-07-03 PROCEDURE — 71020 XR CHEST 2 VW: CPT

## 2017-07-03 PROCEDURE — 87491 CHLMYD TRACH DNA AMP PROBE: CPT | Performed by: FAMILY MEDICINE

## 2017-07-03 PROCEDURE — G0145 SCR C/V CYTO,THINLAYER,RESCR: HCPCS | Performed by: FAMILY MEDICINE

## 2017-07-03 PROCEDURE — 85027 COMPLETE CBC AUTOMATED: CPT | Performed by: NURSE PRACTITIONER

## 2017-07-03 PROCEDURE — 36416 COLLJ CAPILLARY BLOOD SPEC: CPT | Performed by: NURSE PRACTITIONER

## 2017-07-03 PROCEDURE — 99214 OFFICE O/P EST MOD 30 MIN: CPT | Performed by: FAMILY MEDICINE

## 2017-07-03 ASSESSMENT — PAIN SCALES - GENERAL: PAINLEVEL: NO PAIN (0)

## 2017-07-03 NOTE — LETTER
July 5, 2017      Shaylee Mesa  [address] [facility] [address]              Dear Shaylee Mesa,    Your chest x ray is negative for any active Tuberculosis.    Sincerely,    Wendi Murphy MD

## 2017-07-03 NOTE — LETTER
[facility]  [id number] [place] [id number] [phone]    July 5, 2017    Shaylee Mesa  [address]  [facility] [address]    Dear Shaylee,    The results of your recent tests were normal.  Enclosed is a copy of the results.  Results for orders placed or performed in visit on 07/03/17   **CBC with platelets FUTURE anytime   Result Value Ref Range    WBC 7.0 4.0 - 11.0 10e9/L    RBC Count 4.40 3.8 - 5.2 10e12/L    Hemoglobin 12.0 11.7 - 15.7 g/dL    Hematocrit 37.1 35.0 - 47.0 %    MCV 84 78 - 100 fl    MCH 27.3 26.5 - 33.0 pg    MCHC 32.3 31.5 - 36.5 g/dL    RDW 14.3 10.0 - 15.0 %    Platelet Count 268 150 - 450 10e9/L   It was a pleasure to see you at your last appointment. If you have any questions or concerns, please call myself or my nurse at [phone].      Sincerely,      Wendi Murphy MD

## 2017-07-03 NOTE — MR AVS SNAPSHOT
After Visit Summary   7/3/2017    Shaylee Mesa    MRN: [id number]           Patient Information     Date Of Birth          1995        Visit Information        Provider Department      7/3/2017 3:40 PM Wendi Murphy MD [facility]        Today's Diagnoses     Screening examination for venereal disease        Screening for malignant neoplasm of cervix        Routine general medical examination at a health care facility          Care Instructions      Please schedule appointment with Dr Watkins or Laila chatterjee for Nexplanon replacement           Follow-ups after your visit        Your next 10 appointments already scheduled     Jul 07, 2017  2:45 PM CDT   LAB with NL LAB PMC   [facility] ([facility]    [address] [place] [address] [phone]           Patient must bring picture ID.  Patient should be prepared to give a urine specimen  Please do not eat 10-12 hours before your appointment if you are coming in fasting for labs on lipids, cholesterol, or glucose (sugar).  Pregnant women should follow their Care Team instructions. Water with medications is okay. Do not drink coffee or other fluids.   If you have concerns about taking  your medications, please ask at office or if scheduling via [employer], send a message by clicking on Secure Messaging, Message Your Care Team.              Who to contact     If you have questions or need follow up information about today's clinic visit or your schedule please contact [facility] directly at [phone].  Normal or non-critical lab and imaging results will be communicated to you by MyChart, letter or phone within 4 business days after the clinic has received the results. If you do not hear from us within 7 days, please contact the clinic through MyChart or phone. If you have a critical or abnormal lab result, we will notify you by phone as soon as  "possible.  Submit refill requests through [employer] or call your pharmacy and they will forward the refill request to us. Please allow 3 business days for your refill to be completed.          Additional Information About Your Visit        [employer] Information     [employer] lets you send messages to your doctor, view your test results, renew your prescriptions, schedule appointments and more. To sign up, go to [email or web address] . Click on \"Log in\" on the left side of the screen, which will take you to the Welcome page. Then click on \"Sign up Now\" on the right side of the page.     You will be asked to enter the access code listed below, as well as some personal information. Please follow the directions to create your username and password.     Your access code is: [email or web address]  Expires: 2017 11:30 AM     Your access code will  in 90 days. If you need help or a new code, please call your [facility] or [phone].        Care EveryWhere ID     This is your Care EveryWhere ID. This could be used by other organizations to access your [facility] medical records  [id number]        Your Vitals Were     Pulse Temperature Respirations Height Breastfeeding? BMI (Body Mass Index)    88 98.6  F (37  C) (Temporal) 16 5' 8\" (1.727 m) No 49.54 kg/m2       Blood Pressure from Last 3 Encounters:   17 104/70   17 130/80   17 102/80    Weight from Last 3 Encounters:   17 (!) 325 lb 12.8 oz (147.8 kg)   17 (!) 330 lb (149.7 kg)   17 (!) 328 lb (148.8 kg)              We Performed the Following     **CBC with platelets FUTURE anytime     CHLAMYDIA TRACHOMATIS PCR     DEPRESSION ACTION PLAN (DAP)     Pap imaged thin layer screen only - recommended age 21 - 24 years     UA reflex to Microscopic        Primary Care Provider Office Phone # Fax #    DONN Marsh [phone]       [facility] [address] [facility] [address]        Equal Access to " Services     [facility]: Hadii tara mora ebonysawyer Danielali, waaxda luqadaha, qaybta kaalmada felicenoywesley, ant silva . So [place] [phone].    ATENCIÓN: Si habla capri, tiene a cedeno disposición servicios gratuitos de asistencia lingüística. Llame al [phone].    We comply with applicable federal civil rights laws and [place] laws. We do not discriminate on the basis of race, color, national origin, age, disability sex, sexual orientation or gender identity.            Thank you!     Thank you for choosing [facility]  for your care. Our goal is always to provide you with excellent care. Hearing back from our patients is one way we can continue to improve our services. Please take a few minutes to complete the written survey that you may receive in the mail after your visit with us. Thank you!             Your Updated Medication List - Protect others around you: Learn how to safely use, store and throw away your medicines at www.disposemymeds.org.          This list is accurate as of: 7/3/17  4:38 PM.  Always use your most recent med list.                   Brand Name Dispense Instructions for use Diagnosis    ARIPiprazole 30 MG tablet    ABILIFY     Take 30 mg by mouth daily        etonogestrel 68 MG Impl    IMPLANON/NEXPLANON     1 each by Subdermal route once        hydrOXYzine 50 MG tablet    ATARAX     Take 50 mg by mouth prn        Sodium Fluoride 1.1 % Pste    PREVIDENT 5000 BOOSTER PLUS    1 Tube    Apply 1 Dose to affected area At Bedtime    Caries       VITAMIN D (CHOLECALCIFEROL) PO      Take by mouth daily        VYVANSE 70 MG capsule   Generic drug:  lisdexamfetamine     30 capsule    Take 1 capsule (70 mg) by mouth every morning

## 2017-07-03 NOTE — LETTER
July 12, 2017      Shaylee Mesa  [address] [facility] [address]    Dear ,      I am happy to inform you that your recent cervical cancer screening test (PAP smear) was normal.      Preventative screenings such as this help to ensure your health for years to come. You should repeat a pap smear in 3 years, unless otherwise directed.      You will still need to return to the clinic every year for your annual exam and other preventive tests.     Please contact the clinic at [phone] if you have further questions.       Sincerely,      Wendi Murphy MD/tonya

## 2017-07-03 NOTE — LETTER
My Depression Action Plan  Name: Shaylee Mesa   Date of Birth 1995  Date: 6/29/2017    My doctor: Evelyn Lang   My clinic: [address] [address] [phone]          GREEN    ZONE   Good Control    What it looks like:     Things are going generally well. You have normal up s and down s. You may even feel depressed from time to time, but bad moods usually last less than a day.   What you need to do:  1. Continue to care for yourself (see self care plan)  2. Check your depression survival kit and update it as needed  3. Follow your physician s recommendations including any medication.  4. Do not stop taking medication unless you consult with your physician first.           YELLOW         ZONE Getting Worse    What it looks like:     Depression is starting to interfere with your life.     It may be hard to get out of bed; you may be starting to isolate yourself from others.    Symptoms of depression are starting to last most all day and this has happened for several days.     You may have suicidal thoughts but they are not constant.   What you need to do:     1. Call your care team, your response to treatment will improve if you keep your care team informed of your progress. Yellow periods are signs an adjustment may need to be made.     2. Continue your self-care, even if you have to fake it!    3. Talk to someone in your support network    4. Open up your depression survival kit           RED    ZONE Medical Alert - Get Help    What it looks like:     Depression is seriously interfering with your life.     You may experience these or other symptoms: You can t get out of bed most days, can t work or engage in other necessary activities, you have trouble taking care of basic hygiene, or basic responsibilities, thoughts of suicide or death that will not go away, self-injurious behavior.     What you need to do:  1. Call your care team and  request a same-day appointment. If they are not available (weekends or after hours) call your local crisis line, emergency room or 911.      Electronically signed by: Marianela Ramirez, June 29, 2017    Depression Self Care Plan / Survival Kit    Self-Care for Depression  Here s the deal. Your body and mind are really not as separate as most people think.  What you do and think affects how you feel and how you feel influences what you do and think. This means if you do things that people who feel good do, it will help you feel better.  Sometimes this is all it takes.  There is also a place for medication and therapy depending on how severe your depression is, so be sure to consult with your medical provider and/ or Behavioral Health Consultant if your symptoms are worsening or not improving.     In order to better manage my stress, I will:    Exercise  Get some form of exercise, every day. This will help reduce pain and release endorphins, the  feel good  chemicals in your brain. This is almost as good as taking antidepressants!  This is not the same as joining a gym and then never going! (they count on that by the way ) It can be as simple as just going for a walk or doing some gardening, anything that will get you moving.      Hygiene   Maintain good hygiene (Get out of bed in the morning, Make your bed, Brush your teeth, Take a shower, and Get dressed like you were going to work, even if you are unemployed).  If your clothes don't fit try to get ones that do.    Diet  I will strive to eat foods that are good for me, drink plenty of water, and avoid excessive sugar, caffeine, alcohol, and other mood-altering substances.  Some foods that are helpful in depression are: complex carbohydrates, B vitamins, flaxseed, fish or fish oil, fresh fruits and vegetables.    Psychotherapy  I agree to participate in Individual Therapy (if recommended).    Medication  If prescribed medications, I agree to take them.  Missing doses  can result in serious side effects.  I understand that drinking alcohol, or other illicit drug use, may cause potential side effects.  I will not stop my medication abruptly without first discussing it with my provider.    Staying Connected With Others  I will stay in touch with my friends, family members, and my primary care provider/team.    Use your imagination  Be creative.  We all have a creative side; it doesn t matter if it s oil painting, sand castles, or mud pies! This will also kick up the endorphins.    Witness Beauty  (AKA stop and smell the roses) Take a look outside, even in mid-winter. Notice colors, textures. Watch the squirrels and birds.     Service to others  Be of service to others.  There is always someone else in need.  By helping others we can  get out of ourselves  and remember the really important things.  This also provides opportunities for practicing all the other parts of the program.    Humor  Laugh and be silly!  Adjust your TV habits for less news and crime-drama and more comedy.    Control your stress  Try breathing deep, massage therapy, biofeedback, and meditation. Find time to relax each day.     My support system    Clinic Contact:  Phone number:    Contact 1:  Phone number:    Contact 2:  Phone number:    Sikh/:  Phone number:    Therapist:  Phone number:    Local crisis center:    Phone number:    Other community support:  Phone number:

## 2017-07-03 NOTE — NURSING NOTE
"Chief Complaint   Patient presents with     Physical     Panel Management     chlam/gc, pap, dap, phq       Initial /70  Pulse 88  Temp 98.6  F (37  C) (Temporal)  Resp 16  Ht 5' 8\" (1.727 m)  Wt (!) 325 lb 12.8 oz (147.8 kg)  Breastfeeding? No  BMI 49.54 kg/m2 Estimated body mass index is 49.54 kg/(m^2) as calculated from the following:    Height as of this encounter: 5' 8\" (1.727 m).    Weight as of this encounter: 325 lb 12.8 oz (147.8 kg).  Medication Reconciliation: complete     Bonnie Galloway MA    "

## 2017-07-06 LAB
C TRACH DNA SPEC QL NAA+PROBE: NORMAL
SPECIMEN SOURCE: NORMAL

## 2017-07-07 LAB
COPATH REPORT: NORMAL
PAP: NORMAL

## 2017-07-25 ENCOUNTER — TELEPHONE (OUTPATIENT)
Dept: FAMILY MEDICINE | Facility: OTHER | Age: 22
End: 2017-07-25

## 2017-07-25 NOTE — TELEPHONE ENCOUNTER
"Reason for Call:  Other medication information    Detailed comments: Leeann wanted you to know that Shaylee has been on of Vyvanse for about three days due to not being able to get the medication refilled over the weekend. Shaylee has voiced to Leeann/  that she has felt amazing without the medication and she is feeling calm and happy. Shaylee will be seeing [facility] on 7/27. Leeann will be at the appointment and will share this information with them also.       Phone Number Patient can be reached at: Other phone number:  [phone]    Best Time: ***    Can we leave a detailed message on this number? { :[phone]::\"YES\"}    Call taken on 7/25/2017 at 12:50 PM by Trixie Domínguez                                                                                                                                                                                                                                                                                                                                                                                                                                                                                                                                                                                                                                                                                                                                                                                        "

## 2017-07-25 NOTE — TELEPHONE ENCOUNTER
Reason for Call:  Other: patient information     Detailed comments:  Leeann/  wanted to let you know that Shaylee has been off of Vyvanse for three days and is reporting that Shaylee is feeling amazing being off of the medication. She has an appointment with Nancy [facility] associates on 7/27 and Leeann will share this information with them also. Leeann states that Shaylee feels calm and happy being off of the medication.     Phone Number Patient can be reached at: Other phone number:  [phone]    Best Time: any     Can we leave a detailed message on this number? YES    Call taken on 7/25/2017 at 12:53 PM by Trixie Domínguez

## 2017-07-27 ENCOUNTER — TRANSFERRED RECORDS (OUTPATIENT)
Dept: HEALTH INFORMATION MANAGEMENT | Facility: CLINIC | Age: 22
End: 2017-07-27

## 2017-07-27 LAB — PHQ9 SCORE: 13

## 2017-08-09 NOTE — PROGRESS NOTES
SUBJECTIVE:                                                    Shaylee Mesa is a 22 year old female who presents to clinic today for the following health issues:      HPI    Nexplanon removal and reinsertion    Pt has had Nexplanon in place for years.  Has done well with it.  Lighter periods.  No complications or side effects noted.  Would like to continue for now.    Also has PMS, wonders about something for that.      Problem list and histories reviewed & adjusted, as indicated.  Additional history: as documented      Current Outpatient Prescriptions   Medication Sig Dispense Refill     ARIPiprazole (ABILIFY) 15 MG tablet Take 15 mg by mouth daily  5     FLUoxetine (PROZAC) 20 MG capsule Take 1 capsule (20 mg) by mouth daily 30 capsule 1     etonogestrel (IMPLANON/NEXPLANON) 68 MG IMPL 1 each by Subdermal route once       lisdexamfetamine (VYVANSE) 70 MG capsule Take 1 capsule (70 mg) by mouth every morning 30 capsule 0     hydrOXYzine (ATARAX) 50 MG tablet Take 50 mg by mouth prn       Sodium Fluoride (PREVIDENT 5000 BOOSTER PLUS) 1.1 % PSTE Apply 1 Dose to affected area At Bedtime 1 Tube 11     VITAMIN D, CHOLECALCIFEROL, PO Take by mouth daily       [DISCONTINUED] ARIPiprazole (ABILIFY) 30 MG tablet Take 30 mg by mouth daily  5     Recent Labs   Lab Test  11/07/16   1128  06/28/16   0826  07/10/15   0925  03/27/15   0150   LDL   --   71  70   --    HDL   --   49*  44*   --    TRIG   --   47  44   --    ALT   --   23  24  26   CR   --   0.59   --   0.53   GFRESTIMATED   --   >90  Non  GFR Calc     --   >90  Non  GFR Calc     GFRESTBLACK   --   >90   GFR Calc     --   >90   GFR Calc     POTASSIUM   --   4.0   --   3.9   TSH  2.93  0.06*   --    --       BP Readings from Last 3 Encounters:   08/11/17 96/62   07/03/17 104/70   06/20/17 130/80    Wt Readings from Last 3 Encounters:   08/11/17 (!) 320 lb 9.6 oz (145.4 kg)   07/03/17 (!) 325 lb  "12.8 oz (147.8 kg)   06/20/17 (!) 330 lb (149.7 kg)                        ROS:  Constitutional, HEENT, cardiovascular, pulmonary, gi and gu systems are negative, except as otherwise noted.      OBJECTIVE:   BP 96/62 (BP Location: Left arm, Patient Position: Chair, Cuff Size: Adult Large)  Temp 97.3  F (36.3  C) (Oral)  Resp 16  Ht 5' 8\" (1.727 m)  Wt (!) 320 lb 9.6 oz (145.4 kg)  LMP 08/06/2017  BMI 48.75 kg/m2  Body mass index is 48.75 kg/(m^2).  GENERAL: healthy, alert and no distress  SKIN: normal, Nexplanon corinne palpable in her left upper arm.    Diagnostic Test Results:  Results for orders placed or performed in visit on 08/11/17   Beta HCG qual IFA urine   Result Value Ref Range    Beta HCG Qual IFA Urine Negative NEG       ASSESSMENT/PLAN:     BMI:   Estimated body mass index is 48.75 kg/(m^2) as calculated from the following:    Height as of this encounter: 5' 8\" (1.727 m).    Weight as of this encounter: 320 lb 9.6 oz (145.4 kg).   Weight management plan: Discussed healthy diet and exercise guidelines and patient will follow up in 6 months in clinic to re-evaluate.          ICD-10-CM    1. Encounter for initial prescription of implantable subdermal contraceptive Z30.017 Beta HCG qual IFA urine     REMOVE & REINSERT NON-BIODEGRADABLE DRUG DELIVERY IMPLANT     ETONOGESTREL IMPLANT SYSTEM   2. PMDD (premenstrual dysphoric disorder) F32.81 FLUoxetine (PROZAC) 20 MG capsule   3. Morbid obesity, unspecified obesity type (H) E66.01    4. Bipolar affective disorder in remission (H) F31.70      1.  Discussed options.  Pt elected to proceed.    After verifying informed consent and a \"time out\", pt was placed in supine position with left arm externally rotated.  The Nexplanon device was palpated and the removal site marked.  Due to her anxiety, she was initially given some topical lidocaine gel to help minimize the discomfort from the needle.  The area was then prepped and anesthetized with 1% lidocaine with " epinephrine in sterile fashion.  A 3-4 mm incision was made with a scalpel and the end of the Nexplanon corinne was grasped using forceps.  It was then gently pulled out through the opening.  Any surrounding capsule was incised to removed corinne.  This was then grasped with the hemostat and gently removed without incident.  The corinne was measured and found to be 4 cm long.  No remaining device was palpable in the arm.  After verifying Nexplanon device is present in the , the skin was entered with the insertion device in sterile fashion through the previous incision.  This was tunneled in the subdermal space per usual routine.  The insertion needle was then withdrawn using the insertion device lever, leaving Nexplanon corinne in place in arm per  instructions.  Verified that Nexplanon corinne was no longer present in insertion device and that it was in proper location in the patient's arm.  This was oozing more than typical, so 2 small interrupted 4-0 Vicryl sutures were placed to control bleeding.  Bleeding was controlled and insertion wound was dressed.  She tolerated the procedure well.  She was given appropriate paperwork and instructed to have device removed in three years (can be replaced at the same time, if desired).  Also instructed her to have regular well woman care appropriate to her situation and age.  Blood loss was minimal and the wound was dressed.  Post-operative instructions were given to watch for evidence of bleeding, infection or other problems.  Pt expressed understanding.  2.  Discussed some treatment options.  She would be interested in trial of SSRI for this.  Rx given. Follow up in 1-3 months.  3.  Encouraged efforts at weight loss.  4.  Discussed risk of delicia with addition of fluoxetine, especially if she chooses to stop her mood stabilizer.            Patient Instructions   Thank you for visiting [facility]    Wound Care Instructions    1.  Keep a dressing on wound for a  few days until the skin edges have come together.  Do not let it dry out and form a scab as this will make the resulting scar more noticeable.    2.  After 24 hours, may wash gently with soap and water.  After 48 hours, you can soak it, if needed.    3.  If desired, vitamin E oil for 2 weeks after antibiotic ointment may help to decrease scarring.    4.  Protect the area from sun for up to one year afterward as the scar is continuing to remodel.  Sun exposure will also make the resulting scar more noticeable.    5.  Call if the area is very red, tender, has a discharge or is very itchy while healing, or if you have any other questions.  These may be signs of early infection or allergy.    Try the fluoxetine for PMS.  This may help reduce her symptoms.  If no effect or side effects that are too bothersome, then stop the medication.  I would recommend not taking this without Abilify as it can cause delicia if she has bipolar disorder.       Please make an appointment with your either myself or your provider  in 1-2 months for follow up on the medication change.       If you had imaging scheduled please refer to your radiology prep sheet.    Appointment    Date_______________     Time_____________    Day:   M TU W TH F    With____________________________    Location_________________________    If you need medication refills, please contact your pharmacy 3 days before your prescriptions runs out. If you are out of refills, your pharmacy will contact contact the clinic.    Contact us or return if questions or concerns.     -Your Care Team:  MD Becca Gilmore PA-C Folake Falaki, MD Anoshirvan Mazhari, MD Kelly White, AMOR    General information about your clinic      Clinic hours:     Lab hours:  Phone [phone]  Monday 7:30 am-7 pm    Monday 8:30 am-6:30 pm  Tuesday-Friday 7:30 am-5 pm   Tuesday-Friday 8:30 am-4:30 pm    Pharmacy hours:  Phone [phone]  Monday 8:30  am-7pm  Tuesday-Friday 8:30am-6 pm                                       Janethkatlynkelvin assistance [phone]        We would like to hear from you, how was your visit today?    Mary Lopez  Patient Information Supervisor   Patient Care Supervisor  [facility], [facility], [facility]  [phone]         Aba Watkins MD, MD  [facility]

## 2017-08-11 ENCOUNTER — OFFICE VISIT (OUTPATIENT)
Dept: FAMILY MEDICINE | Facility: OTHER | Age: 22
End: 2017-08-11
Payer: COMMERCIAL

## 2017-08-11 VITALS
BODY MASS INDEX: 44.41 KG/M2 | DIASTOLIC BLOOD PRESSURE: 62 MMHG | TEMPERATURE: 97.3 F | WEIGHT: 293 LBS | HEART RATE: 109 BPM | SYSTOLIC BLOOD PRESSURE: 96 MMHG | RESPIRATION RATE: 16 BRPM | HEIGHT: 68 IN

## 2017-08-11 DIAGNOSIS — F32.81 PMDD (PREMENSTRUAL DYSPHORIC DISORDER): ICD-10-CM

## 2017-08-11 DIAGNOSIS — F31.70 BIPOLAR AFFECTIVE DISORDER IN REMISSION (H): ICD-10-CM

## 2017-08-11 DIAGNOSIS — Z30.017 ENCOUNTER FOR INITIAL PRESCRIPTION OF IMPLANTABLE SUBDERMAL CONTRACEPTIVE: Primary | ICD-10-CM

## 2017-08-11 DIAGNOSIS — E66.01 MORBID OBESITY, UNSPECIFIED OBESITY TYPE (H): ICD-10-CM

## 2017-08-11 PROBLEM — Z97.5 NEXPLANON IN PLACE: Status: ACTIVE | Noted: 2017-08-11

## 2017-08-11 LAB — BETA HCG QUAL IFA URINE: NEGATIVE

## 2017-08-11 PROCEDURE — 11983 REMOVE/INSERT DRUG IMPLANT: CPT | Performed by: FAMILY MEDICINE

## 2017-08-11 PROCEDURE — 99213 OFFICE O/P EST LOW 20 MIN: CPT | Mod: 25 | Performed by: FAMILY MEDICINE

## 2017-08-11 PROCEDURE — 84703 CHORIONIC GONADOTROPIN ASSAY: CPT | Performed by: FAMILY MEDICINE

## 2017-08-11 RX ORDER — ARIPIPRAZOLE 15 MG/1
20 TABLET ORAL DAILY
Refills: 5 | COMMUNITY
Start: 2017-07-27

## 2017-08-11 ASSESSMENT — ANXIETY QUESTIONNAIRES
4. TROUBLE RELAXING: NOT AT ALL
5. BEING SO RESTLESS THAT IT IS HARD TO SIT STILL: NOT AT ALL
2. NOT BEING ABLE TO STOP OR CONTROL WORRYING: NOT AT ALL
GAD7 TOTAL SCORE: 0
GAD7 TOTAL SCORE: 0
7. FEELING AFRAID AS IF SOMETHING AWFUL MIGHT HAPPEN: NOT AT ALL
6. BECOMING EASILY ANNOYED OR IRRITABLE: NOT AT ALL
1. FEELING NERVOUS, ANXIOUS, OR ON EDGE: NOT AT ALL
GAD7 TOTAL SCORE: 0
7. FEELING AFRAID AS IF SOMETHING AWFUL MIGHT HAPPEN: NOT AT ALL
3. WORRYING TOO MUCH ABOUT DIFFERENT THINGS: NOT AT ALL

## 2017-08-11 ASSESSMENT — PATIENT HEALTH QUESTIONNAIRE - PHQ9
10. IF YOU CHECKED OFF ANY PROBLEMS, HOW DIFFICULT HAVE THESE PROBLEMS MADE IT FOR YOU TO DO YOUR WORK, TAKE CARE OF THINGS AT HOME, OR GET ALONG WITH OTHER PEOPLE: NOT DIFFICULT AT ALL
SUM OF ALL RESPONSES TO PHQ QUESTIONS 1-9: 4
SUM OF ALL RESPONSES TO PHQ QUESTIONS 1-9: 4

## 2017-08-11 ASSESSMENT — PAIN SCALES - GENERAL: PAINLEVEL: NO PAIN (0)

## 2017-08-11 NOTE — MR AVS SNAPSHOT
After Visit Summary   8/11/2017    Shaylee Mesa    MRN: [id number]           Patient Information     Date Of Birth          1995        Visit Information        Provider Department      8/11/2017 9:30 AM Aba Watkins MD; NL PROC ROOM, [facility]        Today's Diagnoses     Encounter for initial prescription of implantable subdermal contraceptive    -  1    PMDD (premenstrual dysphoric disorder)        Morbid obesity, unspecified obesity type (H)        Bipolar affective disorder in remission (H)          Care Instructions    Thank you for visiting [facility]    Wound Care Instructions    1.  Keep a dressing on wound for a few days until the skin edges have come together.  Do not let it dry out and form a scab as this will make the resulting scar more noticeable.    2.  After 24 hours, may wash gently with soap and water.  After 48 hours, you can soak it, if needed.    3.  If desired, vitamin E oil for 2 weeks after antibiotic ointment may help to decrease scarring.    4.  Protect the area from sun for up to one year afterward as the scar is continuing to remodel.  Sun exposure will also make the resulting scar more noticeable.    5.  Call if the area is very red, tender, has a discharge or is very itchy while healing, or if you have any other questions.  These may be signs of early infection or allergy.    Try the fluoxetine for PMS.  This may help reduce her symptoms.  If no effect or side effects that are too bothersome, then stop the medication.  I would recommend not taking this without Abilify as it can cause delicia if she has bipolar disorder.       Please make an appointment with your either myself or your provider  in 1-2 months for follow up on the medication change.       If you had imaging scheduled please refer to your radiology prep sheet.    Appointment    Date_______________     Time_____________    Day:   M TU W TH    F    With____________________________    Location_________________________    If you need medication refills, please contact your pharmacy 3 days before your prescriptions runs out. If you are out of refills, your pharmacy will contact contact the clinic.    Contact us or return if questions or concerns.     -Your Care Team:  MD Becca Gilmore PA-C Folake Falaki, MD Anoshirvan Mazhari, MD Kelly White, AMOR    General information about your clinic      Clinic hours:     Lab hours:  Phone [phone]  Monday 7:30 am-7 pm    Monday 8:30 am-6:30 pm  Tuesday-Friday 7:30 am-5 pm   Tuesday-Friday 8:30 am-4:30 pm    Pharmacy hours:  Phone [phone]  Monday 8:30 am-7pm  Tuesday-Friday 8:30am-6 pm                                       Mychart assistance [phone]        We would like to hear from you, how was your visit today?    Mary Lopez  Patient Information Supervisor   Patient Care Supervisor  [facility], and [facility], and [facility]  [phone]             Follow-ups after your visit        Who to contact     If you have questions or need follow up information about today's clinic visit or your schedule please contact [facility] directly at [phone].  Normal or non-critical lab and imaging results will be communicated to you by XipLinkhart, letter or phone within 4 business days after the clinic has received the results. If you do not hear from us within 7 days, please contact the clinic through [employer] or phone. If you have a critical or abnormal lab result, we will notify you by phone as soon as possible.  Submit refill requests through Phoneplus or call your pharmacy and they will forward the refill request to us. Please allow 3 business days for your refill to be completed.          Additional Information About Your Visit        Phoneplus Information     Phoneplus lets you send messages to  "your doctor, view your test results, renew your prescriptions, schedule appointments and more. To sign up, go to [email or web address] . Click on \"Log in\" on the left side of the screen, which will take you to the Welcome page. Then click on \"Sign up Now\" on the right side of the page.     You will be asked to enter the access code listed below, as well as some personal information. Please follow the directions to create your username and password.     Your access code is: [email or web address]  Expires: 2017 11:30 AM     Your access code will  in 90 days. If you need help or a new code, please call your [facility] clinic or [phone].        Care EveryWhere ID     This is your Care EveryWhere ID. This could be used by other organizations to access your [facility] medical records  [id number]        Your Vitals Were     Temperature Respirations Height Last Period BMI (Body Mass Index)       97.3  F (36.3  C) (Oral) 16 5' 8\" (1.727 m) 2017 48.75 kg/m2        Blood Pressure from Last 3 Encounters:   17 96/62   17 104/70   17 130/80    Weight from Last 3 Encounters:   17 (!) 320 lb 9.6 oz (145.4 kg)   17 (!) 325 lb 12.8 oz (147.8 kg)   17 (!) 330 lb (149.7 kg)              We Performed the Following     Beta HCG qual IFA urine          Today's Medication Changes          These changes are accurate as of: 17 10:41 AM.  If you have any questions, ask your nurse or doctor.               Start taking these medicines.        Dose/Directions    FLUoxetine 20 MG capsule   Commonly known as:  PROzac   Used for:  PMDD (premenstrual dysphoric disorder)   Started by:  Aba Watkins MD        Dose:  20 mg   Take 1 capsule (20 mg) by mouth daily   Quantity:  30 capsule   Refills:  1            Where to get your medicines      These medications were sent to [address]  [address], [facility] [address]     Phone:  [phone]     " FLUoxetine 20 MG capsule                Primary Care Provider Office Phone # Fax #    Evelyn Lang, APRN [phone]       [address] [facility] [address]        Equal Access to Services     NEELAM HUNTER : Hadii aad ku haddilmao Soomaali, waaxda luqadaha, qaybta kaalmada adeegyada, ant arreolan matteotara recinos ricardo barrera. So [facility] [phone].    ATENCIÓN: Si habla español, tiene a cedeno disposición servicios gratuitos de asistencia lingüística. Llame al [phone].    We comply with applicable federal civil rights laws and [place] laws. We do not discriminate on the basis of race, color, national origin, age, disability sex, sexual orientation or gender identity.            Thank you!     Thank you for choosing [facility]  for your care. Our goal is always to provide you with excellent care. Hearing back from our patients is one way we can continue to improve our services. Please take a few minutes to complete the written survey that you may receive in the mail after your visit with us. Thank you!             Your Updated Medication List - Protect others around you: Learn how to safely use, store and throw away your medicines at www.disposemymeds.org.          This list is accurate as of: 8/11/17 10:41 AM.  Always use your most recent med list.                   Brand Name Dispense Instructions for use Diagnosis    ARIPiprazole 15 MG tablet    ABILIFY     Take 15 mg by mouth daily        etonogestrel 68 MG Impl    IMPLANON/NEXPLANON     1 each by Subdermal route once        FLUoxetine 20 MG capsule    PROzac    30 capsule    Take 1 capsule (20 mg) by mouth daily    PMDD (premenstrual dysphoric disorder)       hydrOXYzine 50 MG tablet    ATARAX     Take 50 mg by mouth prn        Sodium Fluoride 1.1 % Pste    PREVIDENT 5000 BOOSTER PLUS    1 Tube    Apply 1 Dose to affected area At Bedtime    Caries       VITAMIN D (CHOLECALCIFEROL) PO      Take by mouth daily        VYVANSE 70  MG capsule   Generic drug:  lisdexamfetamine     30 capsule    Take 1 capsule (70 mg) by mouth every morning

## 2017-08-11 NOTE — PATIENT INSTRUCTIONS
Thank you for visiting [facility]    Wound Care Instructions    1.  Keep a dressing on wound for a few days until the skin edges have come together.  Do not let it dry out and form a scab as this will make the resulting scar more noticeable.    2.  After 24 hours, may wash gently with soap and water.  After 48 hours, you can soak it, if needed.    3.  If desired, vitamin E oil for 2 weeks after antibiotic ointment may help to decrease scarring.    4.  Protect the area from sun for up to one year afterward as the scar is continuing to remodel.  Sun exposure will also make the resulting scar more noticeable.    5.  Call if the area is very red, tender, has a discharge or is very itchy while healing, or if you have any other questions.  These may be signs of early infection or allergy.    Try the fluoxetine for PMS.  This may help reduce her symptoms.  If no effect or side effects that are too bothersome, then stop the medication.  I would recommend not taking this without Abilify as it can cause delicia if she has bipolar disorder.       Please make an appointment with your either myself or your provider  in 1-2 months for follow up on the medication change.       If you had imaging scheduled please refer to your radiology prep sheet.    Appointment    Date_______________     Time_____________    Day:   M TU W TH F    With____________________________    Location_________________________    If you need medication refills, please contact your pharmacy 3 days before your prescriptions runs out. If you are out of refills, your pharmacy will contact contact the clinic.    Contact us or return if questions or concerns.     -Your Care Team:  MD Becca Gilmore PA-C Folake Falaki, MD Anoshirvan Mazhari, MD Kelly White, AMOR    General information about your clinic      Clinic hours:     Lab hours:  Phone [phone]  Monday 7:30 am-7 pm    Monday 8:30 am-6:30 pm  Tuesday-Friday 7:30  am-5 pm   Tuesday-Friday 8:30 am-4:30 pm    Pharmacy hours:  Phone [phone]  Monday 8:30 am-7pm  Tuesday-Friday 8:30am-6 pm                                       Erict assistance [phone]        We would like to hear from you, how was your visit today?    Mary Lopez  Patient Information Supervisor   Patient Care Supervisor  [facility], and [facility], [place], and [facility]  [phone]

## 2017-08-11 NOTE — NURSING NOTE
"Chief Complaint   Patient presents with     Nexplanon     Panel Management     MyChart, Tdap, phq, ari       Initial BP 96/62 (BP Location: Left arm, Patient Position: Chair, Cuff Size: Adult Large)  Pulse 109  Temp 97.3  F (36.3  C) (Oral)  Resp 16  Ht 5' 8\" (1.727 m)  Wt (!) 320 lb 9.6 oz (145.4 kg)  LMP 08/06/2017  BMI 48.75 kg/m2 Estimated body mass index is 48.75 kg/(m^2) as calculated from the following:    Height as of this encounter: 5' 8\" (1.727 m).    Weight as of this encounter: 320 lb 9.6 oz (145.4 kg).  Medication Reconciliation: complete  Boston Tatum, CMA    "

## 2017-08-12 ASSESSMENT — ANXIETY QUESTIONNAIRES: GAD7 TOTAL SCORE: 0

## 2017-08-12 ASSESSMENT — PATIENT HEALTH QUESTIONNAIRE - PHQ9: SUM OF ALL RESPONSES TO PHQ QUESTIONS 1-9: 4

## 2017-08-22 ENCOUNTER — TRANSFERRED RECORDS (OUTPATIENT)
Dept: HEALTH INFORMATION MANAGEMENT | Facility: CLINIC | Age: 22
End: 2017-08-22

## 2017-08-22 LAB — PHQ9 SCORE: 17

## 2017-10-05 ENCOUNTER — TELEPHONE (OUTPATIENT)
Dept: FAMILY MEDICINE | Facility: CLINIC | Age: 22
End: 2017-10-05

## 2017-10-05 NOTE — TELEPHONE ENCOUNTER
Reason for Call:  Form, our goal is to have forms completed with 72 hours, however, some forms may require a visit or additional information.    Type of letter, form or note:  Form for admin into group home     Who is the form from?: Group home program at [facility] (if other please explain)    Where did the form come from: form was faxed in    What clinic location was the form placed at?: [facility]    Where the form was placed: Form is being faxed in    What number is listed as a contact on the form?: [phone] ext [phone]       Additional comments: Detsiney from [facility] called and states they will be faxing a form over to Joann Bonds. They said that Lanny saw Joann Bonds on 6/20 for a physical and they are faxing over a form about her annual physical that they are asking Joannlaura Bonds to fill out in order for them to continue with the admin process for her to enter a program at a group home. She states the fax back number will be provided on the form and if you have questions or concerns Destiney can be reached at [phone]. Please advise.     Call taken on 10/5/2017 at 1:06 PM by Tavares Rodriguez

## 2017-10-17 NOTE — TELEPHONE ENCOUNTER
LM on id vm regarding faxes that were to be faxed to clinic for completion by provider. We have not received fax as of yet. ACase/MA

## 2017-11-30 DIAGNOSIS — E55.9 VITAMIN D DEFICIENCY: Primary | ICD-10-CM

## 2017-11-30 NOTE — TELEPHONE ENCOUNTER
Requested Prescriptions   Pending Prescriptions Disp Refills     Cholecalciferol (VITAMIN D3) 2000 UNITS CAPS [Pharmacy Med Name: VITAMIN D3 CAP 2000UNIT]  10     Sig: TAKE ONE  CAPSULE BY MOUTH DAILY    Vitamin Supplements (Adult) Protocol Passed    11/30/2017 10:21 AM       Passed - High dose Vitamin D not ordered       Passed - Recent or future visit with authorizing provider's specialty    Patient had office visit in the last year or has a visit in the next 30 days with authorizing provider.  See chart review.              Passed - Patient is age 18 or older

## 2017-12-01 RX ORDER — ACETAMINOPHEN 160 MG
TABLET,DISINTEGRATING ORAL
Qty: 30 CAPSULE | Refills: 10 | Status: SHIPPED | OUTPATIENT
Start: 2017-12-01 | End: 2018-10-04

## 2017-12-01 NOTE — TELEPHONE ENCOUNTER
Routing refill request to provider for review/approval because:  Medication is reported/historical    Ana María Stubbs, RN  [facility]

## 2018-03-19 ENCOUNTER — MEDICAL CORRESPONDENCE (OUTPATIENT)
Dept: HEALTH INFORMATION MANAGEMENT | Facility: CLINIC | Age: 23
End: 2018-03-19

## 2018-03-19 ENCOUNTER — HOSPITAL ENCOUNTER (EMERGENCY)
Facility: CLINIC | Age: 23
Discharge: ANOTHER HOSPITAL | End: 2018-03-20
Attending: EMERGENCY MEDICINE | Admitting: EMERGENCY MEDICINE
Payer: MEDICARE

## 2018-03-19 VITALS
OXYGEN SATURATION: 99 % | RESPIRATION RATE: 14 BRPM | BODY MASS INDEX: 45.01 KG/M2 | TEMPERATURE: 98.7 F | DIASTOLIC BLOOD PRESSURE: 78 MMHG | WEIGHT: 293 LBS | HEART RATE: 105 BPM | SYSTOLIC BLOOD PRESSURE: 122 MMHG

## 2018-03-19 DIAGNOSIS — R45.851 SUICIDAL IDEATION: ICD-10-CM

## 2018-03-19 LAB
ALBUMIN SERPL-MCNC: 3.8 G/DL (ref 3.4–5)
ALBUMIN UR-MCNC: NEGATIVE MG/DL
ALP SERPL-CCNC: 117 U/L (ref 40–150)
ALT SERPL W P-5'-P-CCNC: 23 U/L (ref 0–50)
AMORPH CRY #/AREA URNS HPF: ABNORMAL /HPF
AMPHETAMINES UR QL SCN: POSITIVE
ANION GAP SERPL CALCULATED.3IONS-SCNC: 7 MMOL/L (ref 3–14)
APAP SERPL-MCNC: <2 MG/L (ref 10–20)
APPEARANCE UR: ABNORMAL
AST SERPL W P-5'-P-CCNC: 18 U/L (ref 0–45)
BACTERIA #/AREA URNS HPF: ABNORMAL /HPF
BARBITURATES UR QL: NEGATIVE
BASOPHILS # BLD AUTO: 0 10E9/L (ref 0–0.2)
BASOPHILS NFR BLD AUTO: 0.2 %
BENZODIAZ UR QL: NEGATIVE
BILIRUB SERPL-MCNC: 0.3 MG/DL (ref 0.2–1.3)
BILIRUB UR QL STRIP: NEGATIVE
BUN SERPL-MCNC: 8 MG/DL (ref 7–30)
CALCIUM SERPL-MCNC: 9 MG/DL (ref 8.5–10.1)
CANNABINOIDS UR QL SCN: NEGATIVE
CHLORIDE SERPL-SCNC: 109 MMOL/L (ref 94–109)
CO2 SERPL-SCNC: 22 MMOL/L (ref 20–32)
COCAINE UR QL: NEGATIVE
COLOR UR AUTO: YELLOW
CREAT SERPL-MCNC: 0.69 MG/DL (ref 0.52–1.04)
DIFFERENTIAL METHOD BLD: NORMAL
EOSINOPHIL # BLD AUTO: 0.1 10E9/L (ref 0–0.7)
EOSINOPHIL NFR BLD AUTO: 1.4 %
ERYTHROCYTE [DISTWIDTH] IN BLOOD BY AUTOMATED COUNT: 13.3 % (ref 10–15)
ETHANOL SERPL-MCNC: <0.01 G/DL
GFR SERPL CREATININE-BSD FRML MDRD: >90 ML/MIN/1.7M2
GLUCOSE SERPL-MCNC: 105 MG/DL (ref 70–99)
GLUCOSE UR STRIP-MCNC: NEGATIVE MG/DL
HCG UR QL: NEGATIVE
HCT VFR BLD AUTO: 37.4 % (ref 35–47)
HGB BLD-MCNC: 12.1 G/DL (ref 11.7–15.7)
HGB UR QL STRIP: NEGATIVE
IMM GRANULOCYTES # BLD: 0 10E9/L (ref 0–0.4)
IMM GRANULOCYTES NFR BLD: 0.1 %
KETONES UR STRIP-MCNC: NEGATIVE MG/DL
LEUKOCYTE ESTERASE UR QL STRIP: NEGATIVE
LYMPHOCYTES # BLD AUTO: 1.5 10E9/L (ref 0.8–5.3)
LYMPHOCYTES NFR BLD AUTO: 17.9 %
MCH RBC QN AUTO: 28.1 PG (ref 26.5–33)
MCHC RBC AUTO-ENTMCNC: 32.4 G/DL (ref 31.5–36.5)
MCV RBC AUTO: 87 FL (ref 78–100)
MONOCYTES # BLD AUTO: 0.7 10E9/L (ref 0–1.3)
MONOCYTES NFR BLD AUTO: 7.9 %
MUCOUS THREADS #/AREA URNS LPF: PRESENT /LPF
NEUTROPHILS # BLD AUTO: 6 10E9/L (ref 1.6–8.3)
NEUTROPHILS NFR BLD AUTO: 72.5 %
NITRATE UR QL: NEGATIVE
OPIATES UR QL SCN: NEGATIVE
PCP UR QL SCN: NEGATIVE
PH UR STRIP: 7 PH (ref 5–7)
PLATELET # BLD AUTO: 293 10E9/L (ref 150–450)
POTASSIUM SERPL-SCNC: 3.4 MMOL/L (ref 3.4–5.3)
PROT SERPL-MCNC: 7.8 G/DL (ref 6.8–8.8)
RBC # BLD AUTO: 4.3 10E12/L (ref 3.8–5.2)
RBC #/AREA URNS AUTO: 1 /HPF (ref 0–2)
SALICYLATES SERPL-MCNC: <2 MG/DL
SODIUM SERPL-SCNC: 138 MMOL/L (ref 133–144)
SOURCE: ABNORMAL
SP GR UR STRIP: 1.02 (ref 1–1.03)
SQUAMOUS #/AREA URNS AUTO: 7 /HPF (ref 0–1)
UROBILINOGEN UR STRIP-MCNC: 4 MG/DL (ref 0–2)
WBC # BLD AUTO: 8.3 10E9/L (ref 4–11)
WBC #/AREA URNS AUTO: 1 /HPF (ref 0–5)

## 2018-03-19 PROCEDURE — 99285 EMERGENCY DEPT VISIT HI MDM: CPT | Mod: 25 | Performed by: EMERGENCY MEDICINE

## 2018-03-19 PROCEDURE — 85025 COMPLETE CBC W/AUTO DIFF WBC: CPT | Performed by: EMERGENCY MEDICINE

## 2018-03-19 PROCEDURE — 80320 DRUG SCREEN QUANTALCOHOLS: CPT | Mod: 59 | Performed by: EMERGENCY MEDICINE

## 2018-03-19 PROCEDURE — 81025 URINE PREGNANCY TEST: CPT | Performed by: EMERGENCY MEDICINE

## 2018-03-19 PROCEDURE — 80053 COMPREHEN METABOLIC PANEL: CPT | Performed by: EMERGENCY MEDICINE

## 2018-03-19 PROCEDURE — 80307 DRUG TEST PRSMV CHEM ANLYZR: CPT | Performed by: EMERGENCY MEDICINE

## 2018-03-19 PROCEDURE — 90791 PSYCH DIAGNOSTIC EVALUATION: CPT

## 2018-03-19 PROCEDURE — 80329 ANALGESICS NON-OPIOID 1 OR 2: CPT | Performed by: EMERGENCY MEDICINE

## 2018-03-19 PROCEDURE — 99284 EMERGENCY DEPT VISIT MOD MDM: CPT | Mod: Z6 | Performed by: EMERGENCY MEDICINE

## 2018-03-19 PROCEDURE — 80307 DRUG TEST PRSMV CHEM ANLYZR: CPT | Mod: 59 | Performed by: EMERGENCY MEDICINE

## 2018-03-19 PROCEDURE — 81001 URINALYSIS AUTO W/SCOPE: CPT | Performed by: EMERGENCY MEDICINE

## 2018-03-19 NOTE — ED AVS SNAPSHOT
[facility] Emergency Department    [address] [address]    Phone:  [phone]    Fax:  [phone]                                       Shaylee Mesa   MRN: [id number]    Department:  [facility] Emergency Department   Date of Visit:  3/19/2018           After Visit Summary Signature Page     I have received my discharge instructions, and my questions have been answered. I have discussed any challenges I see with this plan with the nurse or doctor.    ..........................................................................................................................................  Patient/Patient Representative Signature      ..........................................................................................................................................  Patient Representative Print Name and Relationship to Patient    ..................................................               ................................................  Date                                            Time    ..........................................................................................................................................  Reviewed by Signature/Title    ...................................................              ..............................................  Date                                                            Time

## 2018-03-19 NOTE — ED AVS SNAPSHOT
[facility] Emergency Department    [address] [address]    Phone:  [phone]    Fax:  [phone]                                       Shaylee Mesa   MRN: [id number]    Department:  [facility] Emergency Department   Date of Visit:  3/19/2018           Patient Information     Date Of Birth          1995        Your diagnoses for this visit were:     Suicidal ideation        You were seen by Chandra Brennan MD.      Follow-up Information     Follow up with No Ref-Primary, Physician.    Why:  This week        Follow up with [facility] Emergency Department.    Specialty:  EMERGENCY MEDICINE    Why:  If symptoms worsen    Contact information:    [address] [address] [phone]    Additional information:    The medical center is located at   [address]. (between [address] and   [address] in [place], four miles north   of [place]).        Discharge Instructions       Return if symptoms worsen or new symptoms develop.  Follow-up with your therapist and primary care physician this week.  You have contracted for safety and if any further suicidal ideation or other problems present please return for recheck.  You were offered admission but felt safe to go home at this time and requested that.  You have contracted for safety and should return if any further thoughts.  Recognizing Suicide Warning Signs in Yourself  People who are thinking about suicide may not know they are depressed. Certain thoughts, feelings, and actions can be signals that let you know you may need help. The best thing you can do is watch for signs that you may be at risk. Then, ask for help. You can talk to your regular healthcare provider or seek help from a mental health provider.    Depression  Depression is a treatable illness. To know if depression is causing you to feel like ending your life, ask yourself:    Do I feel worthless, guilty, helpless, or  "hopeless?    Have I been feeling sad, down, or blue on most days?    Have I lost interest in my work or people I used to enjoy?    Do I have trouble sleeping or do I sleep too much?    Do I eat more or less than usual?    Do I feel tired, weak, and low on energy?    Do I feel restless and unable to sit still?    Do I have trouble thinking or making choices?    Do I cry more than usual?    Do I feel life isn't worth living?  Warning signs for suicide    Thinking often about taking your life    Planning how you may attempt it    Talking or writing about committing suicide    Feeling that death is the only solution to your problems    Feeling a pressing need to make out your will or arrange your     Giving away things you own    Participating in risky behaviors, such as sex with someone you don't know or drinking and driving    Buying a lethal weapon, such as a gun, or hoarding medicines that could be used in an over dose  If you notice any of these warning signs, call for help right away or go to your closest hospital emergency department. You can also call a mental health clinic or a 24-hour suicide crisis hotline for help and support. Search for local suicide prevention resources on your computer or look for the number in the white pages of your phone book under \"Suicide.\" In an emergency, if you are in immediate risk of harming yourself, call 911. For more information about depression:    National [place] of Mental Zaprff430-085-0215hlu.[facility].nih.gov    National Suicide Prevention Kvsheces141-718-0577 (202-265-DPWL)www.suicidepreventionlifeline.org    National [place] on Mental Qdacrfn024-736-2563pis.tima.org    Mental Health Mpyrcii647-947-5131kix.[facility].org    National Suicide Cpsvpio225-105-8234 (800-SUICIDE)   Date Last Reviewed: 2017-2017 [employer]. [address] [place] [address], [place] [address]. All rights reserved. This information is not intended as a substitute for " professional medical care. Always follow your healthcare professional's instructions.          24 Hour Appointment Hotline       To make an appointment at any [facility] [place], call [phone] ([phone]). If you don't have a family doctor or clinic, we will help you find one. [place] clinics are conveniently located to serve the needs of you and your family.             Review of your medicines      Our records show that you are taking the medicines listed below. If these are incorrect, please call your family doctor or clinic.        Dose / Directions Last dose taken    ARIPiprazole 15 MG tablet   Commonly known as:  ABILIFY   Dose:  20 mg        Take 20 mg by mouth daily   Refills:  5        etonogestrel 68 MG Impl   Commonly known as:  IMPLANON/NEXPLANON   Dose:  1 each        1 each by Subdermal route once   Refills:  0        FLUoxetine 20 MG capsule   Commonly known as:  PROzac   Dose:  20 mg   Quantity:  30 capsule        Take 1 capsule (20 mg) by mouth daily   Refills:  1        hydrOXYzine 50 MG tablet   Commonly known as:  ATARAX   Dose:  50 mg        Take 50 mg by mouth prn   Refills:  0        Sodium Fluoride 1.1 % Pste   Commonly known as:  PREVIDENT 5000 BOOSTER PLUS   Dose:  1 Dose   Quantity:  1 Tube        Apply 1 Dose to affected area At Bedtime   Refills:  11        TOPIRAMATE PO   Dose:  200 mg        Take 200 mg by mouth At Bedtime   Refills:  0        vitamin D3 2000 UNITS Caps   Quantity:  30 capsule        TAKE ONE  CAPSULE BY MOUTH DAILY   Refills:  10        VYVANSE 70 MG capsule   Dose:  70 mg   Quantity:  30 capsule   Generic drug:  lisdexamfetamine        Take 1 capsule (70 mg) by mouth every morning   Refills:  0                Procedures and tests performed during your visit     Acetaminophen level    Alcohol level blood    CBC with platelets, differential    Comprehensive metabolic panel    Drug Screen Urine    HCG qualitative urine    Salicylate level    UA reflex to  Microscopic      Orders Needing Specimen Collection     None      Pending Results     No orders found for last 3 day(s).            Pending Culture Results     No orders found for last 3 day(s).            Pending Results Instructions     If you had any lab results that were not finalized at the time of your Discharge, you can call the ED Lab Result RN at [phone]. You will be contacted by this team for any positive Lab results or changes in treatment. The nurses are available 7 days a week from 10A to 6:30P.  You can leave a message 24 hours per day and they will return your call.        Test Results From Your Hospital Stay        3/19/2018 10:47 PM      Component Results     Component Value Ref Range & Units Status    Color Urine Yellow  Final    Appearance Urine Cloudy  Final    Glucose Urine Negative NEG^Negative mg/dL Final    Bilirubin Urine Negative NEG^Negative Final    Ketones Urine Negative NEG^Negative mg/dL Final    Specific Gravity Urine 1.024 1.003 - 1.035 Final    Blood Urine Negative NEG^Negative Final    pH Urine 7.0 5.0 - 7.0 pH Final    Protein Albumin Urine Negative NEG^Negative mg/dL Final    Urobilinogen mg/dL 4.0 (H) 0.0 - 2.0 mg/dL Final    Nitrite Urine Negative NEG^Negative Final    Leukocyte Esterase Urine Negative NEG^Negative Final    Source Midstream Urine  Final    RBC Urine 1 0 - 2 /HPF Final    WBC Urine 1 0 - 5 /HPF Final    Bacteria Urine Few (A) NEG^Negative /HPF Final    Squamous Epithelial /HPF Urine 7 (H) 0 - 1 /HPF Final    Mucous Urine Present (A) NEG^Negative /LPF Final    Amorphous Crystals Few (A) NEG^Negative /HPF Final         3/19/2018 10:30 PM      Component Results     Component Value Ref Range & Units Status    Amphetamine Qual Urine Positive (A) NEG^Negative Final    Cutoff for a positive amphetamine is greater than 500 ng/mL. This is an   unconfirmed screening result to be used for medical purposes only.      Barbiturates Qual Urine Negative NEG^Negative Final     Cutoff for a negative barbiturate is 200 ng/mL or less.    Benzodiazepine Qual Urine Negative NEG^Negative Final    Cutoff for a negative benzodiazepine is 200 ng/mL or less.    Cannabinoids Qual Urine Negative NEG^Negative Final    Cutoff for a negative cannabinoid is 50 ng/mL or less.    Cocaine Qual Urine Negative NEG^Negative Final    Cutoff for a negative cocaine is 300 ng/mL or less.    Opiates Qualitative Urine Negative NEG^Negative Final    Cutoff for a negative opiate is 300 ng/mL or less.    PCP Qual Urine Negative NEG^Negative Final    Cutoff for a negative PCP is 25 ng/mL or less.         3/19/2018 10:58 PM      Component Results     Component Value Ref Range & Units Status    HCG Qual Urine Negative NEG^Negative Final    This test is for screening purposes.  Results should be interpreted along with   the clinical picture.  Confirmation testing is available if warranted by   ordering [id number], HCG Quantitative Pregnancy.           3/19/2018 10:42 PM      Component Results     Component Value Ref Range & Units Status    WBC 8.3 4.0 - 11.0 10e9/L Final    RBC Count 4.30 3.8 - 5.2 10e12/L Final    Hemoglobin 12.1 11.7 - 15.7 g/dL Final    Hematocrit 37.4 35.0 - 47.0 % Final    MCV 87 78 - 100 fl Final    MCH 28.1 26.5 - 33.0 pg Final    MCHC 32.4 31.5 - 36.5 g/dL Final    RDW 13.3 10.0 - 15.0 % Final    Platelet Count 293 150 - 450 10e9/L Final    Diff Method Automated Method  Final    % Neutrophils 72.5 % Final    % Lymphocytes 17.9 % Final    % Monocytes 7.9 % Final    % Eosinophils 1.4 % Final    % Basophils 0.2 % Final    % Immature Granulocytes 0.1 % Final    Absolute Neutrophil 6.0 1.6 - 8.3 10e9/L Final    Absolute Lymphocytes 1.5 0.8 - 5.3 10e9/L Final    Absolute Monocytes 0.7 0.0 - 1.3 10e9/L Final    Absolute Eosinophils 0.1 0.0 - 0.7 10e9/L Final    Absolute Basophils 0.0 0.0 - 0.2 10e9/L Final    Abs Immature Granulocytes 0.0 0 - 0.4 10e9/L Final         3/19/2018 10:56 PM      Component  "Results     Component Value Ref Range & Units Status    Ethanol g/dL <0.01 <0.01 g/dL Final         3/19/2018 10:56 PM      Component Results     Component Value Ref Range & Units Status    Acetaminophen Level <2 mg/L Final    Therapeutic range: 10-20 mg/L         3/19/2018 10:56 PM      Component Results     Component Value Ref Range & Units Status    Salicylate Level <2 mg/dL Final    Therapeutic:        <20  Anti inflammatory:  15-30           3/19/2018 10:58 PM      Component Results     Component Value Ref Range & Units Status    Sodium 138 133 - 144 mmol/L Final    Potassium 3.4 3.4 - 5.3 mmol/L Final    Chloride 109 94 - 109 mmol/L Final    Carbon Dioxide 22 20 - 32 mmol/L Final    Anion Gap 7 3 - 14 mmol/L Final    Glucose 105 (H) 70 - 99 mg/dL Final    Urea Nitrogen 8 7 - 30 mg/dL Final    Creatinine 0.69 0.52 - 1.04 mg/dL Final    GFR Estimate >90 >60 mL/min/1.7m2 Final    Non  GFR Calc    GFR Estimate If Black >90 >60 mL/min/1.7m2 Final    African American GFR Calc    Calcium 9.0 8.5 - 10.1 mg/dL Final    Bilirubin Total 0.3 0.2 - 1.3 mg/dL Final    Albumin 3.8 3.4 - 5.0 g/dL Final    Protein Total 7.8 6.8 - 8.8 g/dL Final    Alkaline Phosphatase 117 40 - 150 U/L Final    ALT 23 0 - 50 U/L Final    AST 18 0 - 45 U/L Final                Thank you for choosing [facility]       Thank you for choosing [facility] for your care. Our goal is always to provide you with excellent care. Hearing back from our patients is one way we can continue to improve our services. Please take a few minutes to complete the written survey that you may receive in the mail after you visit with us. Thank you!        Lost My Name Information     Lost My Name lets you send messages to your doctor, view your test results, renew your prescriptions, schedule appointments and more. To sign up, go to [email or web address] . Click on \"Log in\" on the left side of the screen, which will take you to the Welcome page. Then click on \"Sign " "up Now\" on the right side of the page.     You will be asked to enter the access code listed below, as well as some personal information. Please follow the directions to create your username and password.     Your access code is: [email or web address]  Expires: 2018  2:50 AM     Your access code will  in 90 days. If you need help or a new code, please call your [facility] clinic or [phone].        Care EveryWhere ID     This is your Care EveryWhere ID. This could be used by other organizations to access your [facility] medical records  [id number]        Equal Access to Services     [facility] : Darcie Priest, magdiel will, servando chavez, ant silva . So [facility] [phone].    ATENCIÓN: Si habla español, tiene a cedeno disposición servicios gratuitos de asistencia lingüística. Llame al [phone].    We comply with applicable federal civil rights laws and [place] laws. We do not discriminate on the basis of race, color, national origin, age, disability, sex, sexual orientation, or gender identity.            After Visit Summary       This is your record. Keep this with you and show to your community pharmacist(s) and doctor(s) at your next visit.                  "

## 2018-03-20 ASSESSMENT — ENCOUNTER SYMPTOMS
BACK PAIN: 0
DIZZINESS: 0
CONFUSION: 0
SHORTNESS OF BREATH: 0
NUMBNESS: 0
ACTIVITY CHANGE: 0
COUGH: 0
ABDOMINAL PAIN: 0
DYSURIA: 0
HEADACHES: 0
NAUSEA: 0
VOMITING: 0
APPETITE CHANGE: 0
LIGHT-HEADEDNESS: 0
NECK PAIN: 0
WEAKNESS: 0

## 2018-03-20 NOTE — ED PROVIDER NOTES
History     Chief Complaint   Patient presents with     Psychiatric Evaluation     hx of cutter, self harm via knife in October. tonight had SI, and came in from group home.      HPI  Shaylee Mesa is a 23 year old female with a history of Asperger's ADHD and chemical dependency along with bipolar disorder who presents the emergency department from a group home complaining of suicidal ideation.  Patient states she cut her left arm with the plug-in end of her phone cord secondary to being upset about not being treated well at her group home.  Patient states that people take things from her after asking to borrow them and she does not get any support from the head of the group home and she is upset about this and therefore wanted to kill herself.  She states she still wants to kill herself.  She denies any recent illness.  She denies any alcohol or drug use.  Problem List:    Patient Active Problem List    Diagnosis Date Noted     Morbid obesity (H) 05/03/2017     Priority: High     PMDD (premenstrual dysphoric disorder) 08/11/2017     Priority: Medium     Nexplanon in place 08/11/2017     Priority: Medium     Depression 10/15/2016     Priority: Medium     Adjustment disorder with depressed mood 04/25/2016     Priority: Medium     Asperger's syndrome 04/25/2016     Priority: Medium     Attention deficit hyperactivity disorder (ADHD), combined type 04/25/2016     Priority: Medium     Bipolar affective disorder in remission (H) 04/25/2016     Priority: Medium        Past Medical History:    Past Medical History:   Diagnosis Date     ADHD (attention deficit hyperactivity disorder)      Asperger's disorder      Bipolar 1 disorder (H)      Bipolar affective disorder (H)      Chemical dependency (H)      Depressive disorder        Past Surgical History:    Past Surgical History:   Procedure Laterality Date     EXAM UNDER ANESTHESIA, RESTORATIONS, EXTRACTION(S) DENTAL COMPLEX, COMBINED N/A 4/28/2017    Procedure:  COMBINED EXAM UNDER ANESTHESIA, RESTORATIONS, EXTRACTION(S) DENTAL COMPLEX;  Dental Exam Under Anesthesia, X-Rays, Restorations x 15, Extractions x 2 , Root Canal x 3 ,Flouride Varnish in the Mouth, dental cleaning, one crown recemented ;  Surgeon: Lea Hopson DDS;  Location: [facility] OR     NO HISTORY OF SURGERY         Family History:    No family history on file.    Social History:  Marital Status:  Single [1]  Social History   Substance Use Topics     Smoking status: Former Smoker     Smokeless tobacco: Never Used     Alcohol use No      Comment: Nov 9, 2016        Medications:      Cholecalciferol (VITAMIN D3) 2000 UNITS CAPS   ARIPiprazole (ABILIFY) 15 MG tablet   FLUoxetine (PROZAC) 20 MG capsule   etonogestrel (IMPLANON/NEXPLANON) 68 MG IMPL   lisdexamfetamine (VYVANSE) 70 MG capsule   hydrOXYzine (ATARAX) 50 MG tablet   Sodium Fluoride (PREVIDENT 5000 BOOSTER PLUS) 1.1 % PSTE   VITAMIN D, CHOLECALCIFEROL, PO         Review of Systems   Constitutional: Negative for activity change and appetite change.   Eyes: Negative for visual disturbance.   Respiratory: Negative for cough and shortness of breath.    Cardiovascular: Negative for chest pain.   Gastrointestinal: Negative for abdominal pain, nausea and vomiting.   Genitourinary: Negative for decreased urine volume and dysuria.   Musculoskeletal: Negative for back pain and neck pain.   Skin: Negative for rash.   Neurological: Negative for dizziness, weakness, light-headedness, numbness and headaches.   Psychiatric/Behavioral: Positive for self-injury and suicidal ideas. Negative for confusion.       Physical Exam   BP: 122/78  Pulse: 105  Temp: 98.7  F (37.1  C)  Resp: 14  Weight: 134.3 kg (296 lb)  SpO2: 99 %      Physical Exam   Constitutional: She is oriented to person, place, and time. She appears well-developed and well-nourished.   HENT:   Head: Normocephalic.   Mouth/Throat: Oropharynx is clear and moist.   Eyes: Conjunctivae are normal.   Neck:  Normal range of motion. Neck supple. No JVD present.   Cardiovascular: Normal rate, regular rhythm, normal heart sounds and intact distal pulses.    No murmur heard.  Pulmonary/Chest: Effort normal and breath sounds normal. She has no wheezes. She has no rales.   Abdominal: Soft. Bowel sounds are normal. There is no tenderness.   Musculoskeletal: Normal range of motion. She exhibits no tenderness.   Neurological: She is alert and oriented to person, place, and time. She exhibits normal muscle tone.   Skin: Skin is warm and dry. No rash noted.   Psychiatric: She has a normal mood and affect.   Nursing note and vitals reviewed.      ED Course     ED Course     Procedures               Critical Care time:  none               Results for orders placed or performed during the hospital encounter of 03/19/18 (from the past 24 hour(s))   UA reflex to Microscopic   Result Value Ref Range    Color Urine Yellow     Appearance Urine Cloudy     Glucose Urine Negative NEG^Negative mg/dL    Bilirubin Urine Negative NEG^Negative    Ketones Urine Negative NEG^Negative mg/dL    Specific Gravity Urine 1.024 1.003 - 1.035    Blood Urine Negative NEG^Negative    pH Urine 7.0 5.0 - 7.0 pH    Protein Albumin Urine Negative NEG^Negative mg/dL    Urobilinogen mg/dL 4.0 (H) 0.0 - 2.0 mg/dL    Nitrite Urine Negative NEG^Negative    Leukocyte Esterase Urine Negative NEG^Negative    Source Midstream Urine     RBC Urine 1 0 - 2 /HPF    WBC Urine 1 0 - 5 /HPF    Bacteria Urine Few (A) NEG^Negative /HPF    Squamous Epithelial /HPF Urine 7 (H) 0 - 1 /HPF    Mucous Urine Present (A) NEG^Negative /LPF    Amorphous Crystals Few (A) NEG^Negative /HPF   Drug Screen Urine   Result Value Ref Range    Amphetamine Qual Urine Positive (A) NEG^Negative    Barbiturates Qual Urine Negative NEG^Negative    Benzodiazepine Qual Urine Negative NEG^Negative    Cannabinoids Qual Urine Negative NEG^Negative    Cocaine Qual Urine Negative NEG^Negative    Opiates  Qualitative Urine Negative NEG^Negative    PCP Qual Urine Negative NEG^Negative   HCG qualitative urine   Result Value Ref Range    HCG Qual Urine Negative NEG^Negative   CBC with platelets, differential   Result Value Ref Range    WBC 8.3 4.0 - 11.0 10e9/L    RBC Count 4.30 3.8 - 5.2 10e12/L    Hemoglobin 12.1 11.7 - 15.7 g/dL    Hematocrit 37.4 35.0 - 47.0 %    MCV 87 78 - 100 fl    MCH 28.1 26.5 - 33.0 pg    MCHC 32.4 31.5 - 36.5 g/dL    RDW 13.3 10.0 - 15.0 %    Platelet Count 293 150 - 450 10e9/L    Diff Method Automated Method     % Neutrophils 72.5 %    % Lymphocytes 17.9 %    % Monocytes 7.9 %    % Eosinophils 1.4 %    % Basophils 0.2 %    % Immature Granulocytes 0.1 %    Absolute Neutrophil 6.0 1.6 - 8.3 10e9/L    Absolute Lymphocytes 1.5 0.8 - 5.3 10e9/L    Absolute Monocytes 0.7 0.0 - 1.3 10e9/L    Absolute Eosinophils 0.1 0.0 - 0.7 10e9/L    Absolute Basophils 0.0 0.0 - 0.2 10e9/L    Abs Immature Granulocytes 0.0 0 - 0.4 10e9/L   Alcohol level blood   Result Value Ref Range    Ethanol g/dL <0.01 <0.01 g/dL   Acetaminophen level   Result Value Ref Range    Acetaminophen Level <2 mg/L   Salicylate level   Result Value Ref Range    Salicylate Level <2 mg/dL   Comprehensive metabolic panel   Result Value Ref Range    Sodium 138 133 - 144 mmol/L    Potassium 3.4 3.4 - 5.3 mmol/L    Chloride 109 94 - 109 mmol/L    Carbon Dioxide 22 20 - 32 mmol/L    Anion Gap 7 3 - 14 mmol/L    Glucose 105 (H) 70 - 99 mg/dL    Urea Nitrogen 8 7 - 30 mg/dL    Creatinine 0.69 0.52 - 1.04 mg/dL    GFR Estimate >90 >60 mL/min/1.7m2    GFR Estimate If Black >90 >60 mL/min/1.7m2    Calcium 9.0 8.5 - 10.1 mg/dL    Bilirubin Total 0.3 0.2 - 1.3 mg/dL    Albumin 3.8 3.4 - 5.0 g/dL    Protein Total 7.8 6.8 - 8.8 g/dL    Alkaline Phosphatase 117 40 - 150 U/L    ALT 23 0 - 50 U/L    AST 18 0 - 45 U/L       Medications - No data to display    Assessments & Plan (with Medical Decision Making) records were reviewed.  Labs were obtained.  DEC  consultation was obtained.  Labs without significant abnormality.  Positive amphetamines were noted in the drug screen.  Patient has on ADHD medication.  Patient discussion with DEC  initially did not contract for safety but after talking with DEC coordinator we discussed again admission and she stated she is not suicidal at this time after considering what the coordinator has said. She understands she has over reacted . She appears calm and is alert and oriented .  She will contract for safety and wants to go home.  I again discussed the case with the DEC evaluator and we did come up with a discharge care plan for the patient to sign.  She is going to contact her friend in the morning and contracts to return if any further homicidal or suicidal ideation.  She will also follow-up with therapist this week.  She understands if any thoughts of harm to herself she will return for recheck.     I have reviewed the nursing notes.    I have reviewed the findings, diagnosis, plan and need for follow up with the patient.       Discharge Medication List as of 3/20/2018  2:50 AM          Final diagnoses:   Suicidal ideation       3/19/2018   [facility] EMERGENCY DEPARTMENT     Chandra Brennan MD  03/20/18 [id number]

## 2018-03-20 NOTE — ED NOTES
Pt signed personal contract for safety sent by the DEC staff. She is transferring back to the [facility].

## 2018-03-20 NOTE — DISCHARGE INSTRUCTIONS
Return if symptoms worsen or new symptoms develop.  Follow-up with your therapist and primary care physician this week.  You have contracted for safety and if any further suicidal ideation or other problems present please return for recheck.  You were offered admission but felt safe to go home at this time and requested that.  You have contracted for safety and should return if any further thoughts.  Recognizing Suicide Warning Signs in Yourself  People who are thinking about suicide may not know they are depressed. Certain thoughts, feelings, and actions can be signals that let you know you may need help. The best thing you can do is watch for signs that you may be at risk. Then, ask for help. You can talk to your regular healthcare provider or seek help from a mental health provider.    Depression  Depression is a treatable illness. To know if depression is causing you to feel like ending your life, ask yourself:    Do I feel worthless, guilty, helpless, or hopeless?    Have I been feeling sad, down, or blue on most days?    Have I lost interest in my work or people I used to enjoy?    Do I have trouble sleeping or do I sleep too much?    Do I eat more or less than usual?    Do I feel tired, weak, and low on energy?    Do I feel restless and unable to sit still?    Do I have trouble thinking or making choices?    Do I cry more than usual?    Do I feel life isn't worth living?  Warning signs for suicide    Thinking often about taking your life    Planning how you may attempt it    Talking or writing about committing suicide    Feeling that death is the only solution to your problems    Feeling a pressing need to make out your will or arrange your     Giving away things you own    Participating in risky behaviors, such as sex with someone you don't know or drinking and driving    Buying a lethal weapon, such as a gun, or hoarding medicines that could be used in an over dose  If you notice any of these warning  "signs, call for help right away or go to your closest hospital emergency department. You can also call a mental health clinic or a 24-hour suicide crisis hotline for help and support. Search for local suicide prevention resources on your computer or look for the number in the white pages of your phone book under \"Suicide.\" In an emergency, if you are in immediate risk of harming yourself, call 911. For more information about depression:    National Altha of Mental Gqjtlk302-425-8918beo.[facility].nih.gov    National Suicide Prevention Qidqnpcw038-961-3750 (183-545-RKPZ)www.suicidepreventionlifeline.org    National [place] on Mental Aaqgwyn480-676-5642eok.tima.org    Mental Health Sbhrjzn318-802-8442wnp.[facility].org    National Suicide Cslbguo086-967-5488 (800-SUICIDE)   Date Last Reviewed: 1/1/2017 2000-2017 [employer]. [address] [place] [address]. All rights reserved. This information is not intended as a substitute for professional medical care. Always follow your healthcare professional's instructions.        "

## 2018-03-26 ENCOUNTER — HOSPITAL ENCOUNTER (EMERGENCY)
Facility: CLINIC | Age: 23
Discharge: HOME | End: 2018-03-27
Attending: EMERGENCY MEDICINE | Admitting: EMERGENCY MEDICINE
Payer: MEDICARE

## 2018-03-26 DIAGNOSIS — F31.70 BIPOLAR AFFECTIVE DISORDER IN REMISSION (H): ICD-10-CM

## 2018-03-26 DIAGNOSIS — F84.5 ASPERGER'S SYNDROME: ICD-10-CM

## 2018-03-26 LAB
AMPHETAMINES UR QL SCN: POSITIVE
BARBITURATES UR QL: NEGATIVE
BENZODIAZ UR QL: NEGATIVE
CANNABINOIDS UR QL SCN: NEGATIVE
COCAINE UR QL: NEGATIVE
ETHANOL UR QL SCN: NEGATIVE
HCG UR QL: NEGATIVE
OPIATES UR QL SCN: NEGATIVE

## 2018-03-26 PROCEDURE — 99285 EMERGENCY DEPT VISIT HI MDM: CPT | Performed by: EMERGENCY MEDICINE

## 2018-03-26 PROCEDURE — 81025 URINE PREGNANCY TEST: CPT | Performed by: FAMILY MEDICINE

## 2018-03-26 PROCEDURE — 80307 DRUG TEST PRSMV CHEM ANLYZR: CPT | Mod: 59 | Performed by: FAMILY MEDICINE

## 2018-03-26 PROCEDURE — 80320 DRUG SCREEN QUANTALCOHOLS: CPT | Performed by: FAMILY MEDICINE

## 2018-03-26 PROCEDURE — 99284 EMERGENCY DEPT VISIT MOD MDM: CPT | Mod: Z6 | Performed by: EMERGENCY MEDICINE

## 2018-03-26 PROCEDURE — 80307 DRUG TEST PRSMV CHEM ANLYZR: CPT | Performed by: FAMILY MEDICINE

## 2018-03-26 NOTE — ED AVS SNAPSHOT
[facility], Emergency Department    [address]    [facility] [place] [address]    Phone:  [phone]    Fax:  [phone]                                       Shaylee Mesa   MRN: [id number]    Department:  [facility], Emergency Department   Date of Visit:  3/26/2018           Patient Information     Date Of Birth          1995        Your diagnoses for this visit were:     Bipolar affective disorder in remission (H)     Asperger's syndrome        You were seen by Levi Collins MD and Chuckie Ghosh MD.        Discharge Instructions       Follow up this week with your therapist and psychiatrist.  Return if persistent symptoms.    24 Hour Appointment Hotline       To make an appointment at any [facility] clinic, call [phone] ([phone]). If you don't have a family doctor or clinic, we will help you find one. [facility] [place] clinics are conveniently located to serve the needs of you and your family.             Review of your medicines      Our records show that you are taking the medicines listed below. If these are incorrect, please call your family doctor or clinic.        Dose / Directions Last dose taken    ARIPiprazole 15 MG tablet   Commonly known as:  ABILIFY   Dose:  20 mg        Take 20 mg by mouth daily   Refills:  5        etonogestrel 68 MG Impl   Commonly known as:  IMPLANON/NEXPLANON   Dose:  1 each        1 each by Subdermal route once   Refills:  0        FLUoxetine 20 MG capsule   Commonly known as:  PROzac   Dose:  20 mg   Quantity:  30 capsule        Take 1 capsule (20 mg) by mouth daily   Refills:  1        hydrOXYzine 50 MG tablet   Commonly known as:  ATARAX   Dose:  50 mg        Take 50 mg by mouth prn   Refills:  0        Sodium Fluoride 1.1 % Pste   Commonly known as:  PREVIDENT 5000 BOOSTER PLUS   Dose:  1 Dose   Quantity:  1 Tube        Apply 1 Dose to affected area At Bedtime   Refills:  11        TOPIRAMATE PO   Dose:  200 mg        Take 200 mg by  "mouth At Bedtime   Refills:  0        vitamin D3 2000 UNITS Caps   Quantity:  30 capsule        TAKE ONE  CAPSULE BY MOUTH DAILY   Refills:  10        VYVANSE 70 MG capsule   Dose:  70 mg   Quantity:  30 capsule   Generic drug:  lisdexamfetamine        Take 1 capsule (70 mg) by mouth every morning   Refills:  0                Procedures and tests performed during your visit     Drug abuse screen 6 urine (tox)    HCG qualitative urine      Orders Needing Specimen Collection     None      Pending Results     No orders found for last 3 day(s).            Pending Culture Results     No orders found for last 3 day(s).            Pending Results Instructions     If you had any lab results that were not finalized at the time of your Discharge, you can call the ED Lab Result RN at [phone]. You will be contacted by this team for any positive Lab results or changes in treatment. The nurses are available 7 days a week from 10A to 6:30P.  You can leave a message 24 hours per day and they will return your call.        Thank you for choosing [facility]       Thank you for choosing [facility] for your care. Our goal is always to provide you with excellent care. Hearing back from our patients is one way we can continue to improve our services. Please take a few minutes to complete the written survey that you may receive in the mail after you visit with us. Thank you!        [employer] Information     [employer] lets you send messages to your doctor, view your test results, renew your prescriptions, schedule appointments and more. To sign up, go to [email or web address] . Click on \"Log in\" on the left side of the screen, which will take you to the Welcome page. Then click on \"Sign up Now\" on the right side of the page.     You will be asked to enter the access code listed below, as well as some personal information. Please follow the directions to create your username and password.     Your access code is: [email or web address]  Expires: " 2018  2:50 AM     Your access code will  in 90 days. If you need help or a new code, please call your [facility] clinic or [phone].        Care EveryWhere ID     This is your Care EveryWhere ID. This could be used by other organizations to access your [facility] medical records  [id number]        Equal Access to Services     NEELAM HUNTER : Darcie Priest, wajosiane will, servando soriaalbronwyn chavez, ant silva . So [facility] [phone].    ATENCIÓN: Si habla español, tiene a cedeno disposición servicios gratuitos de asistencia lingüística. Llame al [phone].    We comply with applicable federal civil rights laws and [place] laws. We do not discriminate on the basis of race, color, national origin, age, disability, sex, sexual orientation, or gender identity.            After Visit Summary       This is your record. Keep this with you and show to your community pharmacist(s) and doctor(s) at your next visit.

## 2018-03-26 NOTE — ED AVS SNAPSHOT
[facility], Emergency Department    [address]    [facility] [address]    Phone:  [phone]    Fax:  [phone]                                       Shaylee Mesa   MRN: [id number]    Department:  [facility], Emergency Department   Date of Visit:  3/26/2018           After Visit Summary Signature Page     I have received my discharge instructions, and my questions have been answered. I have discussed any challenges I see with this plan with the nurse or doctor.    ..........................................................................................................................................  Patient/Patient Representative Signature      ..........................................................................................................................................  Patient Representative Print Name and Relationship to Patient    ..................................................               ................................................  Date                                            Time    ..........................................................................................................................................  Reviewed by Signature/Title    ...................................................              ..............................................  Date                                                            Time

## 2018-03-27 VITALS
OXYGEN SATURATION: 100 % | TEMPERATURE: 97.1 F | WEIGHT: 293 LBS | RESPIRATION RATE: 16 BRPM | DIASTOLIC BLOOD PRESSURE: 72 MMHG | BODY MASS INDEX: 45.01 KG/M2 | HEART RATE: 90 BPM | SYSTOLIC BLOOD PRESSURE: 129 MMHG

## 2018-03-27 PROCEDURE — 25000132 ZZH RX MED GY IP 250 OP 250 PS 637: Performed by: EMERGENCY MEDICINE

## 2018-03-27 PROCEDURE — 90791 PSYCH DIAGNOSTIC EVALUATION: CPT

## 2018-03-27 RX ORDER — LISDEXAMFETAMINE DIMESYLATE 70 MG/1
70 CAPSULE ORAL DAILY
Status: DISCONTINUED | OUTPATIENT
Start: 2018-03-27 | End: 2018-03-27 | Stop reason: HOSPADM

## 2018-03-27 RX ORDER — ARIPIPRAZOLE 20 MG/1
20 TABLET ORAL DAILY
Status: DISCONTINUED | OUTPATIENT
Start: 2018-03-27 | End: 2018-03-27 | Stop reason: HOSPADM

## 2018-03-27 RX ADMIN — FLUOXETINE 20 MG: 20 CAPSULE ORAL at 10:47

## 2018-03-27 RX ADMIN — ARIPIPRAZOLE 20 MG: 20 TABLET ORAL at 10:47

## 2018-03-27 RX ADMIN — LISDEXAMFETAMINE DIMESYLATE 70 MG: 70 CAPSULE ORAL at 10:52

## 2018-03-27 RX ADMIN — VITAMIN D, TAB 1000IU (100/BT) 2000 UNITS: 25 TAB at 10:48

## 2018-03-27 ASSESSMENT — ENCOUNTER SYMPTOMS
PALPITATIONS: 0
SHORTNESS OF BREATH: 0
DIZZINESS: 0
ARTHRALGIAS: 0
NERVOUS/ANXIOUS: 1
FATIGUE: 0
HEADACHES: 0
SORE THROAT: 0
MYALGIAS: 0
COUGH: 0
BRUISES/BLEEDS EASILY: 0
HALLUCINATIONS: 0
DYSPHORIC MOOD: 1
SEIZURES: 0
CHILLS: 0
CONSTITUTIONAL NEGATIVE: 1
VOMITING: 0
ABDOMINAL PAIN: 0
WOUND: 1
RHINORRHEA: 0
FEVER: 0
NECK PAIN: 0
NAUSEA: 0
CHEST TIGHTNESS: 0
CONFUSION: 0
AGITATION: 0
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
## 2018-03-27 NOTE — ED NOTES
Pt's group home will send a ride for pt after 0700. Pt notified and given a more comfortable chair. Attempting to find another ride home for PT.

## 2018-03-27 NOTE — ED PROVIDER NOTES
"  History     Chief Complaint   Patient presents with     Suicidal     superficial cuts to left wrist, pt states it was an attempt to hurt herself     HPI  Shaylee Mesa is a 23 year old female with history of bipolar 1 disorder, bipolar affective disorder, Asperger's disorder, ADHD, anxiety and depression who presents the emergency department today for evaluation of suicidal ideation.  The patient reports that her boyfriend broke up with her today and she became acutely upset and felt suicidal.  She does endorse ongoing suicidal thoughts but with no specific plan.  She reports that she did perform superficial cuts to her arm prior to arrival.  The patient further does make comments that she desires to \"strangle\" her boyfriend.  The patient does not want to go to work tomorrow because she does not want to see her boyfriend, who works where she works.  The patient states that she is not taking her prescribed medications in the past 2 days as she has \"not felt like taking them\".  She states that she has not been sleeping well and has been eating compulsively. She has no physical complaints at this time.  She now reports she feels safe and denies suicidal ideation or any thoughts of self harm. She wants to go home.    No current facility-administered medications for this encounter.      Current Outpatient Prescriptions   Medication     TOPIRAMATE PO     Cholecalciferol (VITAMIN D3) 2000 UNITS CAPS     ARIPiprazole (ABILIFY) 15 MG tablet     etonogestrel (IMPLANON/NEXPLANON) 68 MG IMPL     lisdexamfetamine (VYVANSE) 70 MG capsule     hydrOXYzine (ATARAX) 50 MG tablet     FLUoxetine (PROZAC) 20 MG capsule     Sodium Fluoride (PREVIDENT 5000 BOOSTER PLUS) 1.1 % PSTE     Past Medical History:   Diagnosis Date     ADHD (attention deficit hyperactivity disorder)      Asperger's disorder      Bipolar 1 disorder (H)      Bipolar affective disorder (H)      Chemical dependency (H)      Depressive disorder        Past " Surgical History:   Procedure Laterality Date     EXAM UNDER ANESTHESIA, RESTORATIONS, EXTRACTION(S) DENTAL COMPLEX, COMBINED N/A 4/28/2017    Procedure: COMBINED EXAM UNDER ANESTHESIA, RESTORATIONS, EXTRACTION(S) DENTAL COMPLEX;  Dental Exam Under Anesthesia, X-Rays, Restorations x 15, Extractions x 2 , Root Canal x 3 ,Flouride Varnish in the Mouth, dental cleaning, one crown recemented ;  Surgeon: Lea Hopson DDS;  Location: [facility] OR     NO HISTORY OF SURGERY         No family history on file.    Social History   Substance Use Topics     Smoking status: Former Smoker     Smokeless tobacco: Never Used     Alcohol use No      Comment: Nov 9, 2016     No Known Allergies    I have reviewed the Medications, Allergies, Past Medical and Surgical History, and Social History in the Epic system.    Review of Systems   Constitutional: Negative.  Negative for chills, fatigue and fever.   HENT: Negative for congestion, rhinorrhea and sore throat.    Respiratory: Negative for cough, chest tightness and shortness of breath.    Cardiovascular: Negative for chest pain and palpitations.   Gastrointestinal: Negative for abdominal pain, nausea and vomiting.   Musculoskeletal: Negative for arthralgias, myalgias and neck pain.   Skin: Positive for wound. Negative for rash.        Superficial lacerations on arm   Allergic/Immunologic: Negative for immunocompromised state.   Neurological: Negative for dizziness, seizures, syncope and headaches.   Hematological: Does not bruise/bleed easily.   Psychiatric/Behavioral: Positive for dysphoric mood, self-injury and suicidal ideas. Negative for agitation, confusion and hallucinations. The patient is nervous/anxious.    All other systems reviewed and are negative.      Physical Exam   BP: (!) 143/92  Pulse: 87  Temp: 97.8  F (36.6  C)  Resp: 20  Weight: 134.3 kg (296 lb)  SpO2: 98 %      Physical Exam   Constitutional: She appears well-developed and well-nourished. No distress.   HENT:    Head: Normocephalic and atraumatic.   Mouth/Throat: Oropharynx is clear and moist.   Eyes: Conjunctivae and EOM are normal.   Neck: Normal range of motion.   Cardiovascular: Normal rate, regular rhythm, normal heart sounds and intact distal pulses.    Pulmonary/Chest: Effort normal and breath sounds normal. No respiratory distress.   Abdominal: Soft. There is no tenderness.   Musculoskeletal: She exhibits no edema or tenderness.   Neurological: She is alert.   Skin: Skin is warm and dry. No rash noted.   Superficial lacerations on arm.   Psychiatric: Her affect is blunt. She is withdrawn. She is not agitated, not actively hallucinating and not combative. Thought content is delusional. Thought content is not paranoid. She expresses no homicidal and no suicidal ideation. She is noncommunicative.   Nursing note and vitals reviewed.      ED Course     ED Course     Procedures             Critical Care time:  none             Labs Ordered and Resulted from Time of ED Arrival Up to the Time of Departure from the ED   DRUG ABUSE SCREEN 6 CHEM DEP URINE ([facility]) - Abnormal; Notable for the following:        Result Value    Amphetamine Qual Urine Positive (*)     All other components within normal limits   HCG QUALITATIVE URINE            Assessments & Plan (with Medical Decision Making)   See also mental health  note. She now denies any suicidal or homicidal ideation. Denies any thoughts of harming herself or others. Group home contacted. Patient is to return to group home this morning.    I have reviewed the nursing notes.    I have reviewed the findings, diagnosis, plan and need for follow up with the patient.    New Prescriptions    No medications on file       Final diagnoses:   Bipolar affective disorder in remission (H)   Asperger's syndrome     I, Mateo Holliday, am serving as a trained medical scribe to document services personally performed by Levi Collins MD, based on the provider's statements to me.       I, Levi Collins MD, was physically present and have reviewed and verified the accuracy of this note documented by Mateo Holliday.    3/26/2018   [facility], [place], EMERGENCY DEPARTMENT     Levi Collins MD  03/27/18 0713

## 2018-03-27 NOTE — ED NOTES
No Pt states she is aware that she will be going back to the group home today.  Call recived from [facility] @ stating they will not have the staff to come and get pt until 1730 tonight.

## 2018-06-25 ENCOUNTER — OFFICE VISIT (OUTPATIENT)
Dept: FAMILY MEDICINE | Facility: CLINIC | Age: 23
End: 2018-06-25
Payer: MEDICARE

## 2018-06-25 VITALS
SYSTOLIC BLOOD PRESSURE: 104 MMHG | TEMPERATURE: 98 F | WEIGHT: 293 LBS | DIASTOLIC BLOOD PRESSURE: 72 MMHG | HEART RATE: 88 BPM | BODY MASS INDEX: 44.41 KG/M2 | HEIGHT: 68 IN

## 2018-06-25 DIAGNOSIS — E66.01 MORBID OBESITY (H): ICD-10-CM

## 2018-06-25 DIAGNOSIS — Z00.00 ENCOUNTER FOR ROUTINE ADULT HEALTH EXAMINATION WITHOUT ABNORMAL FINDINGS: Primary | ICD-10-CM

## 2018-06-25 DIAGNOSIS — Z11.3 ROUTINE SCREENING FOR STI (SEXUALLY TRANSMITTED INFECTION): ICD-10-CM

## 2018-06-25 LAB — TSH SERPL DL<=0.005 MIU/L-ACNC: 1.76 MU/L (ref 0.4–4)

## 2018-06-25 PROCEDURE — 84443 ASSAY THYROID STIM HORMONE: CPT | Performed by: NURSE PRACTITIONER

## 2018-06-25 PROCEDURE — 87491 CHLMYD TRACH DNA AMP PROBE: CPT | Performed by: NURSE PRACTITIONER

## 2018-06-25 PROCEDURE — 36415 COLL VENOUS BLD VENIPUNCTURE: CPT | Performed by: NURSE PRACTITIONER

## 2018-06-25 PROCEDURE — 99395 PREV VISIT EST AGE 18-39: CPT | Performed by: NURSE PRACTITIONER

## 2018-06-25 ASSESSMENT — PATIENT HEALTH QUESTIONNAIRE - PHQ9
SUM OF ALL RESPONSES TO PHQ QUESTIONS 1-9: 7
SUM OF ALL RESPONSES TO PHQ QUESTIONS 1-9: 7
10. IF YOU CHECKED OFF ANY PROBLEMS, HOW DIFFICULT HAVE THESE PROBLEMS MADE IT FOR YOU TO DO YOUR WORK, TAKE CARE OF THINGS AT HOME, OR GET ALONG WITH OTHER PEOPLE: VERY DIFFICULT

## 2018-06-25 NOTE — PROGRESS NOTES
SUBJECTIVE:   CC: Shaylee Mesa is an 23 year old woman who presents for preventive health visit.     Physical   Annual:     Getting at least 3 servings of Calcium per day::  NO    Bi-annual eye exam::  Yes    Dental care twice a year::  Yes    Sleep apnea or symptoms of sleep apnea::  Daytime drowsiness    Diet::  Regular (no restrictions)    Frequency of exercise::  None    Taking medications regularly::  No    Barriers to taking medications::  Other    Additional concerns today::  No              Today's PHQ-2 Score:   PHQ-2 ( 1999 [employer]) 6/25/2018   Q1: Little interest or pleasure in doing things 0   Q2: Feeling down, depressed or hopeless 1   PHQ-2 Score 1   Q1: Little interest or pleasure in doing things Not at all   Q2: Feeling down, depressed or hopeless Several days   PHQ-2 Score 1       Abuse: Current or Past(Physical, Sexual or Emotional)- No  Do you feel safe in your environment - Yes    Social History   Substance Use Topics     Smoking status: Current Every Day Smoker     Smokeless tobacco: Never Used     Alcohol use No      Comment: Nov 9, 2016     Alcohol Use 6/25/2018   If you drink alcohol do you typically have greater than 3 drinks per day OR greater than 7 drinks per week? No       Reviewed orders with patient.  Reviewed health maintenance and updated orders accordingly - Yes  Labs reviewed in EPIC    Mammogram not appropriate for this patient based on age.    Pertinent mammograms are reviewed under the imaging tab.  History of abnormal Pap smear:   Last 3 Pap and HPV Results:   PAP / HPV 7/3/2017   PAP NIL     PAP / HPV 7/3/2017   PAP NIL     Reviewed and updated as needed this visit by clinical staff  Tobacco  Allergies  Meds  Med Hx  Surg Hx  Fam Hx  Soc Hx        Reviewed and updated as needed this visit by Provider            Review of Systems  CONSTITUTIONAL: NEGATIVE for fever, chills, change in weight  INTEGUMENTARU/SKIN: NEGATIVE for worrisome rashes, moles or lesions  EYES:  "NEGATIVE for vision changes or irritation  ENT: NEGATIVE for ear, mouth and throat problems  RESP: NEGATIVE for significant cough or SOB  BREAST: NEGATIVE for masses, tenderness or discharge  CV: NEGATIVE for chest pain, palpitations or peripheral edema  GI: NEGATIVE for nausea, abdominal pain, heartburn, or change in bowel habits  : NEGATIVE for unusual urinary or vaginal symptoms. Periods are regular.  MUSCULOSKELETAL: NEGATIVE for significant arthralgias or myalgia  NEURO: NEGATIVE for weakness, dizziness or paresthesias  PSYCHIATRIC: NEGATIVE for changes in mood or affect     OBJECTIVE:   /72 (Cuff Size: Adult Large)  Pulse 88  Temp 98  F (36.7  C) (Tympanic)  Ht 5' 8\" (1.727 m)  Wt 299 lb (135.6 kg)  BMI 45.46 kg/m2  Physical Exam  GENERAL: healthy, alert and no distress  EYES: Eyes grossly normal to inspection, PERRL and conjunctivae and sclerae normal  HENT: ear canals and TM's normal, nose and mouth without ulcers or lesions  NECK: no adenopathy, no asymmetry, masses, or scars and thyroid normal to palpation  RESP: lungs clear to auscultation - no rales, rhonchi or wheezes  BREAST: normal without masses, tenderness or nipple discharge and no palpable axillary masses or adenopathy  CV: regular rate and rhythm, normal S1 S2, no S3 or S4, no murmur, click or rub, no peripheral edema and peripheral pulses strong  ABDOMEN: soft, nontender, no hepatosplenomegaly, no masses and bowel sounds normal  MS: no gross musculoskeletal defects noted, no edema  SKIN: no suspicious lesions or rashes  NEURO: Normal strength and tone, mentation intact and speech normal  PSYCH: mentation appears normal, affect normal/bright    Diagnostic Test Results:  Results for orders placed or performed in visit on 06/25/18 (from the past 24 hour(s))   TSH with free T4 reflex   Result Value Ref Range    TSH 1.76 0.40 - 4.00 mU/L   Chlamydia trachomatis PCR   Result Value Ref Range    Specimen Description Urine     Chlamydia " "Trachomatis PCR Negative NEG^Negative       ASSESSMENT/PLAN:   1. Encounter for routine adult health examination without abnormal findings    - TSH with free T4 reflex    2. Morbid obesity (H)  Physical activity and dietary recommendations made with significant morbid obesity.  Decreasing snacking, big problem for Lanny.    3. Routine screening for STI (sexually transmitted infection)    - Chlamydia trachomatis PCR    Home care instructions were reviewed with the patient. The risks, benefits and treatment options of prescribed medications or other treatments have been discussed with the patient. The patient verbalized their understanding and should call or follow up if no improvement or if they develop further problems.      COUNSELING:  Reviewed preventive health counseling, as reflected in patient instructions    BP Readings from Last 1 Encounters:   06/25/18 104/72     Estimated body mass index is 45.46 kg/(m^2) as calculated from the following:    Height as of this encounter: 5' 8\" (1.727 m).    Weight as of this encounter: 299 lb (135.6 kg).      Weight management plan: Discussed healthy diet and exercise guidelines and patient will follow up in 12 months in clinic to re-evaluate.     reports that she has been smoking.  She has never used smokeless tobacco.  Tobacco Cessation Action Plan: Information offered: Patient not interested at this time    Counseling Resources:  ATP IV Guidelines  Pooled Cohorts Equation Calculator  Breast Cancer Risk Calculator  FRAX Risk Assessment  ICSI Preventive Guidelines  Dietary Guidelines for Americans, 2010  USDA's MyPlate  ASA Prophylaxis  Lung CA Screening    [facility]  Answers for HPI/ROS submitted by the patient on 6/25/2018   PHQ-2 Score: 1  If you checked off any problems, how difficult have these problems made it for you to do your work, take care of things at home, or get along with other people?: Very difficult  PHQ9 " TOTAL SCORE: 7

## 2018-06-25 NOTE — PATIENT INSTRUCTIONS
Preventive Health Recommendations  Female Ages 21 to 25     Yearly exam:     See your health care provider every year in order to  o Review health changes.   o Discuss preventive care.    o Review your medicines if your doctor has prescribed any.      You should be tested each year for STDs (sexually transmitted diseases).       Talk to your provider about how often you should have cholesterol testing.      Get a Pap test every three years. If you have an abnormal result, your doctor may have you test more often.      If you are at risk for diabetes, you should have a diabetes test (fasting glucose).     Shots:     Get a flu shot each year.     Get a tetanus shot every 10 years.     Consider getting the shot (vaccine) that prevents cervical cancer (Gardasil).    Nutrition:     Eat at least 5 servings of fruits and vegetables each day.    Eat whole-grain bread, whole-wheat pasta and brown rice instead of white grains and rice.    Get adequate Calcium and Vitamin D.     Lifestyle    Exercise at least 150 minutes a week each week (30 minutes a day, 5 days a week). This will help you control your weight and prevent disease.    Limit alcohol to one drink per day.    No smoking.     Wear sunscreen to prevent skin cancer.    See your dentist every six months for an exam and cleaning.

## 2018-06-25 NOTE — MR AVS SNAPSHOT
After Visit Summary   6/25/2018    Shaylee Mesa    MRN: [id number]           Patient Information     Date Of Birth          1995        Visit Information        Provider Department      6/25/2018 2:00 PM Radha Luu APRN [facility]        Today's Diagnoses     Routine screening for STI (sexually transmitted infection)    -  1    Encounter for routine adult health examination without abnormal findings          Care Instructions      Preventive Health Recommendations  Female Ages 21 to 25     Yearly exam:     See your health care provider every year in order to  o Review health changes.   o Discuss preventive care.    o Review your medicines if your doctor has prescribed any.      You should be tested each year for STDs (sexually transmitted diseases).       Talk to your provider about how often you should have cholesterol testing.      Get a Pap test every three years. If you have an abnormal result, your doctor may have you test more often.      If you are at risk for diabetes, you should have a diabetes test (fasting glucose).     Shots:     Get a flu shot each year.     Get a tetanus shot every 10 years.     Consider getting the shot (vaccine) that prevents cervical cancer (Gardasil).    Nutrition:     Eat at least 5 servings of fruits and vegetables each day.    Eat whole-grain bread, whole-wheat pasta and brown rice instead of white grains and rice.    Get adequate Calcium and Vitamin D.     Lifestyle    Exercise at least 150 minutes a week each week (30 minutes a day, 5 days a week). This will help you control your weight and prevent disease.    Limit alcohol to one drink per day.    No smoking.     Wear sunscreen to prevent skin cancer.    See your dentist every six months for an exam and cleaning.          Follow-ups after your visit        Who to contact     If you have questions or need follow up information about today's clinic visit or your  "schedule please contact [facility] directly at [phone].  Normal or non-critical lab and imaging results will be communicated to you by MyChart, letter or phone within 4 business days after the clinic has received the results. If you do not hear from us within 7 days, please contact the clinic through MyChart or phone. If you have a critical or abnormal lab result, we will notify you by phone as soon as possible.  Submit refill requests through [employer] or call your pharmacy and they will forward the refill request to us. Please allow 3 business days for your refill to be completed.          Additional Information About Your Visit        Care EveryWhere ID     This is your Care EveryWhere ID. This could be used by other organizations to access your [place] medical records  [id number]        Your Vitals Were     Pulse Temperature Height BMI (Body Mass Index)          88 98  F (36.7  C) (Tympanic) 5' 8\" (1.727 m) 45.46 kg/m2         Blood Pressure from Last 3 Encounters:   06/25/18 104/72   03/27/18 129/72   03/19/18 122/78    Weight from Last 3 Encounters:   06/25/18 299 lb (135.6 kg)   03/26/18 296 lb (134.3 kg)   03/19/18 296 lb (134.3 kg)              We Performed the Following     Chlamydia trachomatis PCR     TSH with free T4 reflex        Primary Care Provider Office Phone # Fax #    DONN Cooney [phone] [id number] [id number]        Equal Access to Services     NEELAM HUNTER AH: Hadii tara ku hadasho Soomaali, waaxda luqadaha, qaybta kaalmada adeegyada, waxroman luz silva . So [facility] [phone].    ATENCIÓN: Si habla capri, tiene a cedeno disposición servicios gratuitos de asistencia lingüística. Llame al [phone].    We comply with applicable federal civil rights laws and [place] laws. We do not discriminate on the basis of race, color, national origin, age, disability, sex, sexual orientation, or gender " identity.            Thank you!     Thank you for choosing [facility]  for your care. Our goal is always to provide you with excellent care. Hearing back from our patients is one way we can continue to improve our services. Please take a few minutes to complete the written survey that you may receive in the mail after your visit with us. Thank you!             Your Updated Medication List - Protect others around you: Learn how to safely use, store and throw away your medicines at www.disposemymeds.org.          This list is accurate as of 6/25/18  2:45 PM.  Always use your most recent med list.                   Brand Name Dispense Instructions for use Diagnosis    ARIPiprazole 15 MG tablet    ABILIFY     Take 20 mg by mouth daily        etonogestrel 68 MG Impl    IMPLANON/NEXPLANON     1 each by Subdermal route once        FLUoxetine 20 MG capsule    PROzac    30 capsule    Take 1 capsule (20 mg) by mouth daily    PMDD (premenstrual dysphoric disorder)       hydrOXYzine 50 MG tablet    ATARAX     Take 50 mg by mouth prn        Sodium Fluoride 1.1 % Pste    PREVIDENT 5000 BOOSTER PLUS    1 Tube    Apply 1 Dose to affected area At Bedtime    Caries       TOPIRAMATE PO      Take 200 mg by mouth At Bedtime        vitamin D3 2000 units Caps     30 capsule    TAKE ONE  CAPSULE BY MOUTH DAILY    Vitamin D deficiency       VYVANSE 70 MG capsule   Generic drug:  lisdexamfetamine     30 capsule    Take 1 capsule (70 mg) by mouth every morning

## 2018-06-26 LAB
C TRACH DNA SPEC QL NAA+PROBE: NEGATIVE
SPECIMEN SOURCE: NORMAL

## 2018-06-26 ASSESSMENT — PATIENT HEALTH QUESTIONNAIRE - PHQ9: SUM OF ALL RESPONSES TO PHQ QUESTIONS 1-9: 7

## 2018-07-16 ENCOUNTER — OFFICE VISIT (OUTPATIENT)
Dept: URGENT CARE | Facility: URGENT CARE | Age: 23
End: 2018-07-16
Payer: MEDICARE

## 2018-07-16 VITALS
RESPIRATION RATE: 20 BRPM | DIASTOLIC BLOOD PRESSURE: 69 MMHG | WEIGHT: 293 LBS | TEMPERATURE: 97.5 F | BODY MASS INDEX: 44.85 KG/M2 | OXYGEN SATURATION: 100 % | SYSTOLIC BLOOD PRESSURE: 106 MMHG | HEART RATE: 80 BPM

## 2018-07-16 DIAGNOSIS — L03.90 CELLULITIS, UNSPECIFIED CELLULITIS SITE: ICD-10-CM

## 2018-07-16 DIAGNOSIS — L50.9 URTICARIA: Primary | ICD-10-CM

## 2018-07-16 PROCEDURE — 99213 OFFICE O/P EST LOW 20 MIN: CPT | Performed by: NURSE PRACTITIONER

## 2018-07-16 RX ORDER — CEPHALEXIN 500 MG/1
500 CAPSULE ORAL 4 TIMES DAILY
Qty: 40 CAPSULE | Refills: 0 | Status: SHIPPED | OUTPATIENT
Start: 2018-07-16 | End: 2018-08-10

## 2018-07-16 RX ORDER — PREDNISONE 20 MG/1
TABLET ORAL
Qty: 16 TABLET | Refills: 0 | Status: SHIPPED | OUTPATIENT
Start: 2018-07-16 | End: 2018-08-10

## 2018-07-16 NOTE — PATIENT INSTRUCTIONS
Continue to monitor if not improving should return.\\      Follow-up with your primary care provider next week and as needed.    Indications for emergent return to emergency department discussed with patient, who verbalized good understanding and agreement.  Patient understands the limitations of today's evaluation.         Understanding Hives (Urticaria)  Urticaria (hives) are red, itchy, and swollen areas on the skin. They are most often an allergic reaction from eating a food or taking a medicine. Sometimes the cause may be unknown. A single hive can vary in size from a half inch to several inches. Hives can appear all over the body. Or they may appear on only one part of the body.  What causes hives?  Hives can be caused by food and beverages such as:    Tree nuts (almonds, walnuts, hazelnuts)    Peanuts    Eggs    Shellfish    Milk  Hives can also be caused by medicines such as:    Antibiotics, especially penicillin and sulfa-based medicines     Anticonvulsant or antiseizure medicines     Chemotherapy medicines   Other causes of hives include:    Infection or virus    Heat    Cold air or cold water    Exercise    Scratching or rubbing your skin, or wearing tight-fitting clothes that rub your skin    Being exposed to sunlight or light from a light bulb, in rare cases    Inhaled-chemicals in the environment from foods and drugs, insects, plants, or other sources  In some cases, hives may occur again and again with no specific cause.  If you have hives    Stay away from the food, drink, medicine, or other thing that may be causing the hives.    Ask your healthcare provider how to control itchy or irritated skin.    Talk with your healthcare provider right away if you think a medicine gave you hives.  Watch for anaphylaxis  If you have hives, watch for symptoms of a severe reaction that can affect your entire body. This is called anaphylaxis. Symptoms can include swollen areas of the body, wheezing, trouble  breathing or swallowing, and a hoarse voice. This reaction may happen right away. Or it may happen in an hour or more. In extreme cases, the airways from mouth to lungs may swell and make breathing difficult. This is a medical emergency. Use epinephrine medicine if you have it, and call 911 or go to the emergency room.     When to call your healthcare provider  Call your healthcare provider if:    Your hives feel uncomfortable    You have never had hives before    Your symptoms don't go away or come back    Your symptoms get worse or new symptoms develop such as:   ? Sneezing, coughing, runny or stuffy nose  ? Itching of the eyes, nose, or roof of the mouth  ? Itching, burning, stinging, or pain  ? Dry, flaky, cracking, or scaly skin  ? Red or purple spots  Call 911  Call 911 right away if you have:    Swelling in your lips, tongue, or throat, called angioedema    Drooling    Trouble breathing, talking, or swallowing    Cool, moist or pale (blue in color) skin    Fast and weak heartbeat    Wheezing or short of breath    Feeling lightheaded or confused    Diarrhea    Severe nausea or vomiting    Seizure    Feeling dizzy or weak, or a sudden drop in blood pressure   Date Last Reviewed: 4/1/2017 2000-2017 [employer]. [address] [place] [address], [place] [address]. All rights reserved. This information is not intended as a substitute for professional medical care. Always follow your healthcare professional's instructions.        Cellulitis  Cellulitis is an infection of the deep layers of skin. A break in the skin, such as a cut or scratch, can let bacteria under the skin. If the bacteria get to deep layers of the skin, it can be serious. If not treated, cellulitis can get into the bloodstream and lymph nodes. The infection can then spread throughout the body. This causes serious illness.  Cellulitis causes the affected skin to become red, swollen, warm, and sore. The reddened areas have a visible border.  An open sore may leak fluid (pus). You may have a fever, chills, and pain.  Cellulitis is treated with antibiotics taken for 7 to 10 days. An open sore may be cleaned and covered with cool wet gauze. Symptoms should get better 1 to 2 days after treatment is started. Make sure to take all the antibiotics for the full number of days until they are gone. Keep taking the medicine even if your symptoms go away.  Home care  Follow these tips:    Limit the use of the part of your body with cellulitis.     If the infection is on your leg, keep your leg raised while sitting. This will help to reduce swelling.    Take all of the antibiotic medicine exactly as directed until it is gone. Do not miss any doses, especially during the first 7 days. Don t stop taking the medicine when your symptoms get better.    Keep the affected area clean and dry.    Wash your hands with soap and warm water before and after touching your skin. Anyone else who touches your skin should also wash his or her hands. Don't share towels.  Follow-up care  Follow up with your healthcare provider, or as advised. If your infection does not go away on the first antibiotic, your healthcare provider will prescribe a different one.  When to seek medical advice  Call your healthcare provider right away if any of these occur:    Red areas that spread    Swelling or pain that gets worse    Fluid leaking from the skin (pus)    Fever higher of 100.4  F (38.0  C) or higher after 2 days on antibiotics  Date Last Reviewed: 9/1/2016 2000-2017 [employer]. [address] [place] [address], [place] [address]. All rights reserved. This information is not intended as a substitute for professional medical care. Always follow your healthcare professional's instructions.        Discharge Instructions for Cellulitis  You have been diagnosed with cellulitis. This is an infection in the deepest layer of the skin. In some cases, the infection also affects the muscle.  Cellulitis is caused by bacteria. The bacteria can enter the body through broken skin. This can happen with a cut, scratch, animal bite, or an insect bite that has been scratched. You may have been treated in the hospital with antibiotics and fluids. You will likely be given a prescription for antibiotics to take at home. This sheet will help you take care of yourself at home.  Home care  When you are home:    Take the prescribed antibiotic medicine you are given as directed until it is gone. Take it even if you feel better. It treats the infection and stops it from returning. Not taking all the medicine can make future infections hard to treat.    Keep the infected area clean.    When possible, raise the infected area above the level of your heart. This helps keep swelling down.    Talk with your healthcare provider if you are in pain. Ask what kind of over-the-counter medicine you can take for pain.    Apply clean bandages as advised.    Take your temperature once a day for a week.    Wash your hands often to prevent spreading the infection.  In the future, wash your hands before and after you touch cuts, scratches, or bandages. This will help prevent infection.   When to call your healthcare provider  Call your healthcare provider immediately if you have any of the following:    Difficulty or pain when moving the joints above or below the infected area    Discharge or pus draining from the area    Fever of 100.4 F (38 C) or higher, or as directed by your healthcare provider    Pain that gets worse in or around the infected     Redness that gets worse in or around the infected area, particularly if the area of redness expands to a wider area    Shaking chills    Swelling of the infected area    Vomiting   Date Last Reviewed: 8/1/2016 2000-2017 [employer]. [address] [place] [address], [place] [address]. All rights reserved. This information is not intended as a substitute for professional medical care.  Always follow your healthcare professional's instructions.

## 2018-07-16 NOTE — PROGRESS NOTES
Shaylee Mesa is a 23 year old female who presents to the clinic today for a rash.  Onset of rash was 1 day ago.  Location of the rash: generalized.  Associated symptoms include: blisters and itching.  Symptoms appear to be worsening.  Therapies tried to improve the rash: Benadryl.  Recent new medication? No  Previous history of a similar rash? No  Recent exposure history:  Was at a house with bugs     Patient Active Problem List   Diagnosis     Adjustment disorder with depressed mood     Asperger's syndrome     Attention deficit hyperactivity disorder (ADHD), combined type     Bipolar affective disorder in remission (H)     Depression     Morbid obesity (H)     PMDD (premenstrual dysphoric disorder)     Nexplanon in place       Current Outpatient Prescriptions   Medication Sig Dispense Refill     ARIPiprazole (ABILIFY) 15 MG tablet Take 20 mg by mouth daily   5     cephALEXin (KEFLEX) 500 MG capsule Take 1 capsule (500 mg) by mouth 4 times daily 40 capsule 0     Cholecalciferol (VITAMIN D3) 2000 UNITS CAPS TAKE ONE  CAPSULE BY MOUTH DAILY 30 capsule 10     etonogestrel (IMPLANON/NEXPLANON) 68 MG IMPL 1 each by Subdermal route once       FLUoxetine (PROZAC) 20 MG capsule Take 1 capsule (20 mg) by mouth daily 30 capsule 1     hydrOXYzine (ATARAX) 50 MG tablet Take 50 mg by mouth prn       lisdexamfetamine (VYVANSE) 70 MG capsule Take 1 capsule (70 mg) by mouth every morning 30 capsule 0     predniSONE (DELTASONE) 20 MG tablet Take 3 tabs (60 mg) by mouth daily x 1 days, 2 tabs (40 mg) daily x 4 days, 1 tab (20 mg) daily x 3 days, then 1/2 tab (10 mg) x 3 days. 16 tablet 0     Sodium Fluoride (PREVIDENT 5000 BOOSTER PLUS) 1.1 % PSTE Apply 1 Dose to affected area At Bedtime 1 Tube 11     TOPIRAMATE PO Take 200 mg by mouth At Bedtime         ROS:  CONSTITUTIONAL:NEGATIVE for fever, chills, change in weight  INTEGUMENTARY/SKIN: See above   EYES: NEGATIVE for vision changes or irritation  ENT/MOUTH: NEGATIVE for ear,  mouth and throat problems  RESP:NEGATIVE for significant cough or SOB  CV: NEGATIVE for chest pain, palpitations or peripheral edema  MUSCULOSKELETAL: NEGATIVE for significant arthralgias or myalgia  NEURO: NEGATIVE for weakness, dizziness or paresthesias    EXAM: This patient appears alert .  VITALS: Blood pressure 106/69, pulse 80, temperature 97.5  F (36.4  C), temperature source Tympanic, resp. rate 20, weight 295 lb (133.8 kg), SpO2 100 %, not currently breastfeeding.    Rash description:     Location: arm, lower, arm, upper, back, chest, generalized and lower leg Examination of the rash reveals: Hives: multiple pleomorphic, raised, well-defined, blanching patches with wheals and flares. To the lower legs there is 1 cm of redness around sores   HEENT: NORMAL - no erythema, no adenopathy, no exudates.  RESP: Normal - Clear to auscultation without rales, rhonchi, or wheezing.  CARDIAC: NORMAL - regular rate and rhythm without murmur.    Assessment:    ICD-10-CM    1. Urticaria L50.9 predniSONE (DELTASONE) 20 MG tablet   2. Cellulitis, unspecified cellulitis site L03.90 cephALEXin (KEFLEX) 500 MG capsule         Plan:  Rash most representative of hives  lower leg looks like.  Has some cellulitis around it will treat with a course of prednisone as well as Keflex.  Did review signs and symptoms of scabies this does not appear to be scabies but again did have some exposure to a house with bugs unknown what kind did let them know but watch for in regards to scabies symptoms  Patient Instructions     Continue to monitor if not improving should return.\\      Follow-up with your primary care provider next week and as needed.    Indications for emergent return to emergency department discussed with patient, who verbalized good understanding and agreement.  Patient understands the limitations of today's evaluation.         Understanding Hives (Urticaria)  Urticaria (hives) are red, itchy, and swollen areas on the skin.  They are most often an allergic reaction from eating a food or taking a medicine. Sometimes the cause may be unknown. A single hive can vary in size from a half inch to several inches. Hives can appear all over the body. Or they may appear on only one part of the body.  What causes hives?  Hives can be caused by food and beverages such as:    Tree nuts (almonds, walnuts, hazelnuts)    Peanuts    Eggs    Shellfish    Milk  Hives can also be caused by medicines such as:    Antibiotics, especially penicillin and sulfa-based medicines     Anticonvulsant or antiseizure medicines     Chemotherapy medicines   Other causes of hives include:    Infection or virus    Heat    Cold air or cold water    Exercise    Scratching or rubbing your skin, or wearing tight-fitting clothes that rub your skin    Being exposed to sunlight or light from a light bulb, in rare cases    Inhaled-chemicals in the environment from foods and drugs, insects, plants, or other sources  In some cases, hives may occur again and again with no specific cause.  If you have hives    Stay away from the food, drink, medicine, or other thing that may be causing the hives.    Ask your healthcare provider how to control itchy or irritated skin.    Talk with your healthcare provider right away if you think a medicine gave you hives.  Watch for anaphylaxis  If you have hives, watch for symptoms of a severe reaction that can affect your entire body. This is called anaphylaxis. Symptoms can include swollen areas of the body, wheezing, trouble breathing or swallowing, and a hoarse voice. This reaction may happen right away. Or it may happen in an hour or more. In extreme cases, the airways from mouth to lungs may swell and make breathing difficult. This is a medical emergency. Use epinephrine medicine if you have it, and call 911 or go to the emergency room.     When to call your healthcare provider  Call your healthcare provider if:    Your hives feel  uncomfortable    You have never had hives before    Your symptoms don't go away or come back    Your symptoms get worse or new symptoms develop such as:   ? Sneezing, coughing, runny or stuffy nose  ? Itching of the eyes, nose, or roof of the mouth  ? Itching, burning, stinging, or pain  ? Dry, flaky, cracking, or scaly skin  ? Red or purple spots  Call 911  Call 911 right away if you have:    Swelling in your lips, tongue, or throat, called angioedema    Drooling    Trouble breathing, talking, or swallowing    Cool, moist or pale (blue in color) skin    Fast and weak heartbeat    Wheezing or short of breath    Feeling lightheaded or confused    Diarrhea    Severe nausea or vomiting    Seizure    Feeling dizzy or weak, or a sudden drop in blood pressure   Date Last Reviewed: 4/1/2017 2000-2017 [employer]. [address] [place] [address]. All rights reserved. This information is not intended as a substitute for professional medical care. Always follow your healthcare professional's instructions.        Cellulitis  Cellulitis is an infection of the deep layers of skin. A break in the skin, such as a cut or scratch, can let bacteria under the skin. If the bacteria get to deep layers of the skin, it can be serious. If not treated, cellulitis can get into the bloodstream and lymph nodes. The infection can then spread throughout the body. This causes serious illness.  Cellulitis causes the affected skin to become red, swollen, warm, and sore. The reddened areas have a visible border. An open sore may leak fluid (pus). You may have a fever, chills, and pain.  Cellulitis is treated with antibiotics taken for 7 to 10 days. An open sore may be cleaned and covered with cool wet gauze. Symptoms should get better 1 to 2 days after treatment is started. Make sure to take all the antibiotics for the full number of days until they are gone. Keep taking the medicine even if your symptoms go away.  Home  care  Follow these tips:    Limit the use of the part of your body with cellulitis.     If the infection is on your leg, keep your leg raised while sitting. This will help to reduce swelling.    Take all of the antibiotic medicine exactly as directed until it is gone. Do not miss any doses, especially during the first 7 days. Don t stop taking the medicine when your symptoms get better.    Keep the affected area clean and dry.    Wash your hands with soap and warm water before and after touching your skin. Anyone else who touches your skin should also wash his or her hands. Don't share towels.  Follow-up care  Follow up with your healthcare provider, or as advised. If your infection does not go away on the first antibiotic, your healthcare provider will prescribe a different one.  When to seek medical advice  Call your healthcare provider right away if any of these occur:    Red areas that spread    Swelling or pain that gets worse    Fluid leaking from the skin (pus)    Fever higher of 100.4  F (38.0  C) or higher after 2 days on antibiotics  Date Last Reviewed: 9/1/2016 2000-2017 [employer]. [address] [place] [address]. All rights reserved. This information is not intended as a substitute for professional medical care. Always follow your healthcare professional's instructions.        Discharge Instructions for Cellulitis  You have been diagnosed with cellulitis. This is an infection in the deepest layer of the skin. In some cases, the infection also affects the muscle. Cellulitis is caused by bacteria. The bacteria can enter the body through broken skin. This can happen with a cut, scratch, animal bite, or an insect bite that has been scratched. You may have been treated in the hospital with antibiotics and fluids. You will likely be given a prescription for antibiotics to take at home. This sheet will help you take care of yourself at home.  Home care  When you are home:    Take  the prescribed antibiotic medicine you are given as directed until it is gone. Take it even if you feel better. It treats the infection and stops it from returning. Not taking all the medicine can make future infections hard to treat.    Keep the infected area clean.    When possible, raise the infected area above the level of your heart. This helps keep swelling down.    Talk with your healthcare provider if you are in pain. Ask what kind of over-the-counter medicine you can take for pain.    Apply clean bandages as advised.    Take your temperature once a day for a week.    Wash your hands often to prevent spreading the infection.  In the future, wash your hands before and after you touch cuts, scratches, or bandages. This will help prevent infection.   When to call your healthcare provider  Call your healthcare provider immediately if you have any of the following:    Difficulty or pain when moving the joints above or below the infected area    Discharge or pus draining from the area    Fever of 100.4 F (38 C) or higher, or as directed by your healthcare provider    Pain that gets worse in or around the infected     Redness that gets worse in or around the infected area, particularly if the area of redness expands to a wider area    Shaking chills    Swelling of the infected area    Vomiting   Date Last Reviewed: 8/1/2016 2000-2017 [employer]. [address] [place] [address], [place] [address]. All rights reserved. This information is not intended as a substitute for professional medical care. Always follow your healthcare professional's instructions.              DONN Gilman CNP

## 2018-07-16 NOTE — MR AVS SNAPSHOT
After Visit Summary   7/16/2018    Shaylee Mesa    MRN: [id number]           Patient Information     Date Of Birth          1995        Visit Information        Provider Department      7/16/2018 5:05 PM Catherine Thrasher APRN [facility]        Today's Diagnoses     Urticaria    -  1    Cellulitis, unspecified cellulitis site          Care Instructions    Continue to monitor if not improving should return.\\      Follow-up with your primary care provider next week and as needed.    Indications for emergent return to emergency department discussed with patient, who verbalized good understanding and agreement.  Patient understands the limitations of today's evaluation.         Understanding Hives (Urticaria)  Urticaria (hives) are red, itchy, and swollen areas on the skin. They are most often an allergic reaction from eating a food or taking a medicine. Sometimes the cause may be unknown. A single hive can vary in size from a half inch to several inches. Hives can appear all over the body. Or they may appear on only one part of the body.  What causes hives?  Hives can be caused by food and beverages such as:    Tree nuts (almonds, walnuts, hazelnuts)    Peanuts    Eggs    Shellfish    Milk  Hives can also be caused by medicines such as:    Antibiotics, especially penicillin and sulfa-based medicines     Anticonvulsant or antiseizure medicines     Chemotherapy medicines   Other causes of hives include:    Infection or virus    Heat    Cold air or cold water    Exercise    Scratching or rubbing your skin, or wearing tight-fitting clothes that rub your skin    Being exposed to sunlight or light from a light bulb, in rare cases    Inhaled-chemicals in the environment from foods and drugs, insects, plants, or other sources  In some cases, hives may occur again and again with no specific cause.  If you have hives    Stay away from the food, drink, medicine, or  other thing that may be causing the hives.    Ask your healthcare provider how to control itchy or irritated skin.    Talk with your healthcare provider right away if you think a medicine gave you hives.  Watch for anaphylaxis  If you have hives, watch for symptoms of a severe reaction that can affect your entire body. This is called anaphylaxis. Symptoms can include swollen areas of the body, wheezing, trouble breathing or swallowing, and a hoarse voice. This reaction may happen right away. Or it may happen in an hour or more. In extreme cases, the airways from mouth to lungs may swell and make breathing difficult. This is a medical emergency. Use epinephrine medicine if you have it, and call 911 or go to the emergency room.     When to call your healthcare provider  Call your healthcare provider if:    Your hives feel uncomfortable    You have never had hives before    Your symptoms don't go away or come back    Your symptoms get worse or new symptoms develop such as:   ? Sneezing, coughing, runny or stuffy nose  ? Itching of the eyes, nose, or roof of the mouth  ? Itching, burning, stinging, or pain  ? Dry, flaky, cracking, or scaly skin  ? Red or purple spots  Call 911  Call 911 right away if you have:    Swelling in your lips, tongue, or throat, called angioedema    Drooling    Trouble breathing, talking, or swallowing    Cool, moist or pale (blue in color) skin    Fast and weak heartbeat    Wheezing or short of breath    Feeling lightheaded or confused    Diarrhea    Severe nausea or vomiting    Seizure    Feeling dizzy or weak, or a sudden drop in blood pressure   Date Last Reviewed: 4/1/2017 2000-2017 [employer]. [address] [place] [address], [place] [address]. All rights reserved. This information is not intended as a substitute for professional medical care. Always follow your healthcare professional's instructions.        Cellulitis  Cellulitis is an infection of the deep layers of skin. A  break in the skin, such as a cut or scratch, can let bacteria under the skin. If the bacteria get to deep layers of the skin, it can be serious. If not treated, cellulitis can get into the bloodstream and lymph nodes. The infection can then spread throughout the body. This causes serious illness.  Cellulitis causes the affected skin to become red, swollen, warm, and sore. The reddened areas have a visible border. An open sore may leak fluid (pus). You may have a fever, chills, and pain.  Cellulitis is treated with antibiotics taken for 7 to 10 days. An open sore may be cleaned and covered with cool wet gauze. Symptoms should get better 1 to 2 days after treatment is started. Make sure to take all the antibiotics for the full number of days until they are gone. Keep taking the medicine even if your symptoms go away.  Home care  Follow these tips:    Limit the use of the part of your body with cellulitis.     If the infection is on your leg, keep your leg raised while sitting. This will help to reduce swelling.    Take all of the antibiotic medicine exactly as directed until it is gone. Do not miss any doses, especially during the first 7 days. Don t stop taking the medicine when your symptoms get better.    Keep the affected area clean and dry.    Wash your hands with soap and warm water before and after touching your skin. Anyone else who touches your skin should also wash his or her hands. Don't share towels.  Follow-up care  Follow up with your healthcare provider, or as advised. If your infection does not go away on the first antibiotic, your healthcare provider will prescribe a different one.  When to seek medical advice  Call your healthcare provider right away if any of these occur:    Red areas that spread    Swelling or pain that gets worse    Fluid leaking from the skin (pus)    Fever higher of 100.4  F (38.0  C) or higher after 2 days on antibiotics  Date Last Reviewed: 9/1/2016 2000-2017 [employer]. [address] [place] [address]. All rights reserved. This information is not intended as a substitute for professional medical care. Always follow your healthcare professional's instructions.        Discharge Instructions for Cellulitis  You have been diagnosed with cellulitis. This is an infection in the deepest layer of the skin. In some cases, the infection also affects the muscle. Cellulitis is caused by bacteria. The bacteria can enter the body through broken skin. This can happen with a cut, scratch, animal bite, or an insect bite that has been scratched. You may have been treated in the hospital with antibiotics and fluids. You will likely be given a prescription for antibiotics to take at home. This sheet will help you take care of yourself at home.  Home care  When you are home:    Take the prescribed antibiotic medicine you are given as directed until it is gone. Take it even if you feel better. It treats the infection and stops it from returning. Not taking all the medicine can make future infections hard to treat.    Keep the infected area clean.    When possible, raise the infected area above the level of your heart. This helps keep swelling down.    Talk with your healthcare provider if you are in pain. Ask what kind of over-the-counter medicine you can take for pain.    Apply clean bandages as advised.    Take your temperature once a day for a week.    Wash your hands often to prevent spreading the infection.  In the future, wash your hands before and after you touch cuts, scratches, or bandages. This will help prevent infection.   When to call your healthcare provider  Call your healthcare provider immediately if you have any of the following:    Difficulty or pain when moving the joints above or below the infected area    Discharge or pus draining from the area    Fever of 100.4 F (38 C) or higher, or as directed by your healthcare provider    Pain that gets worse in or  around the infected     Redness that gets worse in or around the infected area, particularly if the area of redness expands to a wider area    Shaking chills    Swelling of the infected area    Vomiting   Date Last Reviewed: 8/1/2016 2000-2017 [employer]. [address] [place] [address]. All rights reserved. This information is not intended as a substitute for professional medical care. Always follow your healthcare professional's instructions.                Follow-ups after your visit        Who to contact     If you have questions or need follow up information about today's clinic visit or your schedule please contact [facility] directly at [phone].  Normal or non-critical lab and imaging results will be communicated to you by MyChart, letter or phone within 4 business days after the clinic has received the results. If you do not hear from us within 7 days, please contact the clinic through MyChart or phone. If you have a critical or abnormal lab result, we will notify you by phone as soon as possible.  Submit refill requests through [employer] or call your pharmacy and they will forward the refill request to us. Please allow 3 business days for your refill to be completed.          Additional Information About Your Visit        Care EveryWhere ID     This is your Care EveryWhere ID. This could be used by other organizations to access your [place] medical records  [id number]        Your Vitals Were     Pulse Temperature Respirations Pulse Oximetry BMI (Body Mass Index)       80 97.5  F (36.4  C) (Tympanic) 20 100% 44.85 kg/m2        Blood Pressure from Last 3 Encounters:   07/16/18 106/69   06/25/18 104/72   03/27/18 129/72    Weight from Last 3 Encounters:   07/16/18 295 lb (133.8 kg)   06/25/18 299 lb (135.6 kg)   03/26/18 296 lb (134.3 kg)              Today, you had the following     No orders found for display         Today's Medication  Changes          These changes are accurate as of 7/16/18  5:47 PM.  If you have any questions, ask your nurse or doctor.               Start taking these medicines.        Dose/Directions    cephALEXin 500 MG capsule   Commonly known as:  KEFLEX   Used for:  Cellulitis, unspecified cellulitis site   Started by:  Catherine Thrasher APRN CNP        Dose:  500 mg   Take 1 capsule (500 mg) by mouth 4 times daily   Quantity:  40 capsule   Refills:  0       predniSONE 20 MG tablet   Commonly known as:  DELTASONE   Used for:  Urticaria   Started by:  Catherine Thrasher APRN CNP        Take 3 tabs (60 mg) by mouth daily x 1 days, 2 tabs (40 mg) daily x 4 days, 1 tab (20 mg) daily x 3 days, then 1/2 tab (10 mg) x 3 days.   Quantity:  16 tablet   Refills:  0            Where to get your medicines      These medications were sent to [id number] [place] [address] [facility]  [address] [place] [address]    Hours:  Closed 10-16-08 business to [facility] Phone:  [phone]     cephALEXin 500 MG capsule    predniSONE 20 MG tablet                Primary Care Provider Office Phone # Fax #    Radha Hall DONN Luu [phone] [address] [facility] [address]        Equal Access to Services     NEELAM HUNTER AH: Darcie mora hadasho Soomaali, waaxda luqadaha, qaybta kaalmada adeegyada, ant escobar hayede barrera. So [facility] [phone].    ATENCIÓN: Si habla español, tiene a cedeno disposición servicios gratuitos de asistencia lingüística. Llame al [phone].    We comply with applicable federal civil rights laws and [place] laws. We do not discriminate on the basis of race, color, national origin, age, disability, sex, sexual orientation, or gender identity.            Thank you!     Thank you for choosing [facility]  for your care. Our goal is always to provide you with excellent care. Hearing back from our patients is  one way we can continue to improve our services. Please take a few minutes to complete the written survey that you may receive in the mail after your visit with us. Thank you!             Your Updated Medication List - Protect others around you: Learn how to safely use, store and throw away your medicines at www.disposemymeds.org.          This list is accurate as of 7/16/18  5:47 PM.  Always use your most recent med list.                   Brand Name Dispense Instructions for use Diagnosis    ARIPiprazole 15 MG tablet    ABILIFY     Take 20 mg by mouth daily        cephALEXin 500 MG capsule    KEFLEX    40 capsule    Take 1 capsule (500 mg) by mouth 4 times daily    Cellulitis, unspecified cellulitis site       etonogestrel 68 MG Impl    IMPLANON/NEXPLANON     1 each by Subdermal route once        FLUoxetine 20 MG capsule    PROzac    30 capsule    Take 1 capsule (20 mg) by mouth daily    PMDD (premenstrual dysphoric disorder)       hydrOXYzine 50 MG tablet    ATARAX     Take 50 mg by mouth prn        predniSONE 20 MG tablet    DELTASONE    16 tablet    Take 3 tabs (60 mg) by mouth daily x 1 days, 2 tabs (40 mg) daily x 4 days, 1 tab (20 mg) daily x 3 days, then 1/2 tab (10 mg) x 3 days.    Urticaria       Sodium Fluoride 1.1 % Pste    PREVIDENT 5000 BOOSTER PLUS    1 Tube    Apply 1 Dose to affected area At Bedtime    Caries       TOPIRAMATE PO      Take 200 mg by mouth At Bedtime        vitamin D3 2000 units Caps     30 capsule    TAKE ONE  CAPSULE BY MOUTH DAILY    Vitamin D deficiency       VYVANSE 70 MG capsule   Generic drug:  lisdexamfetamine     30 capsule    Take 1 capsule (70 mg) by mouth every morning

## 2018-07-17 ENCOUNTER — MEDICAL CORRESPONDENCE (OUTPATIENT)
Dept: HEALTH INFORMATION MANAGEMENT | Facility: CLINIC | Age: 23
End: 2018-07-17

## 2018-07-17 ENCOUNTER — TELEPHONE (OUTPATIENT)
Dept: FAMILY MEDICINE | Facility: CLINIC | Age: 23
End: 2018-07-17

## 2018-07-17 NOTE — LETTER
July 17, 2018      Shaylee Mesa  [address] [address]        To Whom It May Concern:    Shaylee Mesa was seen in our clinic 7/16/18. She may return to work without restrictions.      Sincerely,        DONN Reilly CNP

## 2018-07-17 NOTE — TELEPHONE ENCOUNTER
Pt was seen yesterday by Catherine Thrasher for bug bites. She needs a note that says she is able to work and is not contagious.  Please fax to her group home at [phone]

## 2018-08-10 ENCOUNTER — OFFICE VISIT (OUTPATIENT)
Dept: FAMILY MEDICINE | Facility: CLINIC | Age: 23
End: 2018-08-10
Payer: MEDICARE

## 2018-08-10 VITALS
HEART RATE: 64 BPM | WEIGHT: 293 LBS | HEIGHT: 68 IN | DIASTOLIC BLOOD PRESSURE: 68 MMHG | BODY MASS INDEX: 44.41 KG/M2 | SYSTOLIC BLOOD PRESSURE: 102 MMHG | TEMPERATURE: 97.6 F

## 2018-08-10 DIAGNOSIS — R19.7 DIARRHEA, UNSPECIFIED TYPE: ICD-10-CM

## 2018-08-10 DIAGNOSIS — T74.21XD SEXUAL ASSAULT OF ADULT, SUBSEQUENT ENCOUNTER: Primary | ICD-10-CM

## 2018-08-10 DIAGNOSIS — R11.0 NAUSEA: ICD-10-CM

## 2018-08-10 PROCEDURE — 99213 OFFICE O/P EST LOW 20 MIN: CPT | Performed by: NURSE PRACTITIONER

## 2018-08-10 RX ORDER — EMTRICITABINE AND TENOFOVIR DISOPROXIL FUMARATE 200; 300 MG/1; MG/1
1 TABLET, FILM COATED ORAL DAILY
COMMUNITY

## 2018-08-10 RX ORDER — ONDANSETRON 4 MG/1
4 TABLET, ORALLY DISINTEGRATING ORAL EVERY 8 HOURS PRN
COMMUNITY
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
## 2018-08-20 ENCOUNTER — TELEPHONE (OUTPATIENT)
Dept: FAMILY MEDICINE | Facility: CLINIC | Age: 23
End: 2018-08-20

## 2018-08-20 NOTE — TELEPHONE ENCOUNTER
Reason for call:  Other   Patient called regarding (reason for call): Group home questioning how long [place]  Additional comments: None    Phone number to reach patient:  Other phone number:  [phone]*    Best Time:  Any    Can we leave a detailed message on this number?  YES

## 2018-08-20 NOTE — TELEPHONE ENCOUNTER
Brenda,    Please advise. This medication is listed as patient reported/historical.    Lakisha LEDBETTER RN

## 2018-08-22 NOTE — TELEPHONE ENCOUNTER
Nurse called states she will fax something to have pcp sign to discontinue med after 28 days. Joya Banegas RN

## 2018-10-04 DIAGNOSIS — E55.9 VITAMIN D DEFICIENCY: ICD-10-CM

## 2018-10-04 RX ORDER — ACETAMINOPHEN 160 MG
TABLET,DISINTEGRATING ORAL
Qty: 30 CAPSULE | Refills: 10 | Status: SHIPPED | OUTPATIENT
Start: 2018-10-04

## 2018-10-04 NOTE — TELEPHONE ENCOUNTER
Routing refill request to provider for review/approval because:  Patient has not been seen in our clinic for 1.5 years. Will route to PCP     FAIZA Cherry, RN  [facility]

## 2018-10-04 NOTE — TELEPHONE ENCOUNTER
"Requested Prescriptions   Pending Prescriptions Disp Refills     Cholecalciferol (VITAMIN D3) 2000 units CAPS [Pharmacy Med Name: VITAMIN D3 CAP 2000UNIT]  10    Last Written Prescription Date:  12/1/17  Last Fill Quantity: 30,  # refills: 10   Last office visit: 8/10/2018 with prescribing provider:  5/3/17   Future Office Visit:     Sig: TAKE ONE  CAPSULE BY MOUTH DAILY    Vitamin Supplements (Adult) Protocol Passed    10/4/2018 10:15 AM       Passed - High dose Vitamin D not ordered       Passed - Recent (12 mo) or future (30 days) visit within the authorizing provider's specialty    Patient had office visit in the last 12 months or has a visit in the next 30 days with authorizing provider or within the authorizing provider's specialty.  See \"Patient Info\" tab in inbasket, or \"Choose Columns\" in Meds & Orders section of the refill encounter.            "

## 2019-03-27 ENCOUNTER — TELEPHONE (OUTPATIENT)
Dept: FAMILY MEDICINE | Facility: CLINIC | Age: 24
End: 2019-03-27

## 2019-03-27 NOTE — TELEPHONE ENCOUNTER
Noted. Discharged from [facility] 3/26/19 per discharge note.  Should follow up in clinic for hospital follow up.    DONN Reilly CNP

## 2019-03-27 NOTE — TELEPHONE ENCOUNTER
Reason for Call:  Other     Detailed comments: Edwin Diaz called (women from [employer] hung up before I would get name and phone number)- Patient was in Hospital and left.  Patient should be seen by PCP - I tried to call Patient to get her an appt and she was not at home at the time .      Phone Number Patient can be reached at:     Best Time:     Can we leave a detailed message on this number? YES    Call taken on 3/27/2019 at 1:41 PM by Prabha Chawla

## 2019-05-30 ENCOUNTER — COMMUNICATION - HEALTHEAST (OUTPATIENT)
Dept: BEHAVIORAL HEALTH | Facility: CLINIC | Age: 24
End: 2019-05-30

## 2019-05-30 DIAGNOSIS — Z20.6 HIV EXPOSURE: ICD-10-CM

## 2020-08-20 ENCOUNTER — HOSPITAL ENCOUNTER (EMERGENCY)
Facility: CLINIC | Age: 25
Discharge: HOME | End: 2020-08-21
Attending: PSYCHIATRY & NEUROLOGY | Admitting: PSYCHIATRY & NEUROLOGY
Payer: MEDICARE

## 2020-08-20 DIAGNOSIS — F31.70 MIXED BIPOLAR I DISORDER IN REMISSION (H): ICD-10-CM

## 2020-08-20 DIAGNOSIS — F70 MILD INTELLECTUAL DISABILITIES: ICD-10-CM

## 2020-08-20 DIAGNOSIS — F60.3 BORDERLINE PERSONALITY DISORDER (H): ICD-10-CM

## 2020-08-20 DIAGNOSIS — Z86.59 HISTORY OF BIPOLAR DISORDER: ICD-10-CM

## 2020-08-20 DIAGNOSIS — F84.0 AUTISTIC DISORDER, RESIDUAL STATE: ICD-10-CM

## 2020-08-20 DIAGNOSIS — Z79.899 ENCOUNTER FOR LONG-TERM (CURRENT) USE OF OTHER MEDICATIONS: ICD-10-CM

## 2020-08-20 DIAGNOSIS — Z86.59 HISTORY OF ASPERGER'S SYNDROME: ICD-10-CM

## 2020-08-20 LAB
AMPHETAMINES UR QL SCN: NEGATIVE
BARBITURATES UR QL: NEGATIVE
BENZODIAZ UR QL: NEGATIVE
CANNABINOIDS UR QL SCN: NEGATIVE
COCAINE UR QL: NEGATIVE
ETHANOL UR QL SCN: NEGATIVE
HCG UR QL: NEGATIVE
OPIATES UR QL SCN: NEGATIVE

## 2020-08-20 PROCEDURE — 80307 DRUG TEST PRSMV CHEM ANLYZR: CPT | Performed by: FAMILY MEDICINE

## 2020-08-20 PROCEDURE — 99285 EMERGENCY DEPT VISIT HI MDM: CPT | Mod: 25

## 2020-08-20 PROCEDURE — 25000132 ZZH RX MED GY IP 250 OP 250 PS 637: Mod: GY | Performed by: PSYCHIATRY & NEUROLOGY

## 2020-08-20 PROCEDURE — 80320 DRUG SCREEN QUANTALCOHOLS: CPT | Performed by: FAMILY MEDICINE

## 2020-08-20 PROCEDURE — 81025 URINE PREGNANCY TEST: CPT | Performed by: FAMILY MEDICINE

## 2020-08-20 PROCEDURE — 99284 EMERGENCY DEPT VISIT MOD MDM: CPT | Mod: Z6 | Performed by: PSYCHIATRY & NEUROLOGY

## 2020-08-20 PROCEDURE — 90791 PSYCH DIAGNOSTIC EVALUATION: CPT

## 2020-08-20 RX ORDER — OLANZAPINE 10 MG/1
10 TABLET, ORALLY DISINTEGRATING ORAL ONCE
Status: COMPLETED | OUTPATIENT
Start: 2020-08-20 | End: 2020-08-20

## 2020-08-20 RX ADMIN — OLANZAPINE 10 MG: 10 TABLET, ORALLY DISINTEGRATING ORAL at 21:59

## 2020-08-20 ASSESSMENT — ENCOUNTER SYMPTOMS
MUSCULOSKELETAL NEGATIVE: 1
EYES NEGATIVE: 1
CONSTITUTIONAL NEGATIVE: 1
HALLUCINATIONS: 0
RESPIRATORY NEGATIVE: 1
GASTROINTESTINAL NEGATIVE: 1
ENDOCRINE NEGATIVE: 1
NEUROLOGICAL NEGATIVE: 1
HYPERACTIVE: 0
CARDIOVASCULAR NEGATIVE: 1

## 2020-08-20 NOTE — ED AVS SNAPSHOT
[facility], [place], Emergency Department  [address]  [facility] [address]  Phone:  [phone]  Fax:  [phone]                                    Shaylee Mesa   MRN: [id number]    Department:  [facility], Emergency Department   Date of Visit:  8/20/2020           After Visit Summary Signature Page    I have received my discharge instructions, and my questions have been answered. I have discussed any challenges I see with this plan with the nurse or doctor.    ..........................................................................................................................................  Patient/Patient Representative Signature      ..........................................................................................................................................  Patient Representative Print Name and Relationship to Patient    ..................................................               ................................................  Date                                   Time    ..........................................................................................................................................  Reviewed by Signature/Title    ...................................................              ..............................................  Date                                               Time          22EPIC Rev 08/18

## 2020-08-21 ENCOUNTER — PATIENT OUTREACH (OUTPATIENT)
Dept: CARE COORDINATION | Facility: CLINIC | Age: 25
End: 2020-08-21

## 2020-08-21 VITALS
RESPIRATION RATE: 12 BRPM | DIASTOLIC BLOOD PRESSURE: 49 MMHG | HEART RATE: 82 BPM | OXYGEN SATURATION: 98 % | SYSTOLIC BLOOD PRESSURE: 95 MMHG | TEMPERATURE: 98.3 F

## 2020-08-21 NOTE — ED NOTES
Bed: ED16A  Expected date: 8/20/20  Expected time: 8:26 PM  Means of arrival:   Comments:  H654  25 F  SI/Looking for better placement due to not liking her current placement

## 2020-08-21 NOTE — ED NOTES
I have performed an in person assessment of the patient. Based on this assessment the patient no longer requires a one on one attendant at this point in time.    Jennifer Zepeda DO  9:16 PM  August 20, 2020         Jnenifer Zepeda DO  08/20/20 6365

## 2020-08-21 NOTE — ED NOTES
"Patient reports that she just recently started staying at this  and wanted to see her friends today and the  wouldn't let her. Patient tearful speaking loudly about this and does not want to go back to  \"I will fight my way out of here.\" Patient reports mom is legal guardian.   "

## 2020-08-21 NOTE — ED NOTES
[facility] updated on patient status and that patient returning.     [phone]

## 2020-08-21 NOTE — ED NOTES
Patient alert/oriented. Stable on feet. AVS reviewed patient sent back to group home, staff aware, guardian aware. Report to EMS. Safe to discharge home.

## 2020-08-21 NOTE — ED NOTES
Unhappy with current residence because her mom and friends cannot visit her. She states she didn't like the staff and wanted to cut her wrist.

## 2020-08-21 NOTE — ED PROVIDER NOTES
ED Provider Note  [facility]      History     Chief Complaint   Patient presents with     Suicidal     From GH at [facility] - Made SI comments. Denied physical complaint or substance abuse.      HPI  Shaylee Mesa is a 25 year old female who is here via EMS from [facility]. Patient has been placed there past week after a 5 week hospital stay at [facility]. Her mother is her guardian. Patient has history of borderline personality disorder, Asperger's, and bipolar disorder. She has been doing well and had a good session with her therapist today. She then got upset as she cannot have visitors due to COVID-19. She felt she no longer wanted to be at [facility] and threatened suicide. She does not exhibit psychosis.    Please see DEC Crisis Assessment on 08/20/2020 in Epic for further details.    PERSONAL MEDICAL HISTORY  Past Medical History:   Diagnosis Date     ADHD (attention deficit hyperactivity disorder)      Asperger's disorder      Bipolar 1 disorder (H)      Bipolar affective disorder (H)      Chemical dependency (H)      Depressive disorder      PAST SURGICAL HISTORY  Past Surgical History:   Procedure Laterality Date     EXAM UNDER ANESTHESIA, RESTORATIONS, EXTRACTION(S) DENTAL COMPLEX, COMBINED N/A 4/28/2017    Procedure: COMBINED EXAM UNDER ANESTHESIA, RESTORATIONS, EXTRACTION(S) DENTAL COMPLEX;  Dental Exam Under Anesthesia, X-Rays, Restorations x 15, Extractions x 2 , Root Canal x 3 ,Flouride Varnish in the Mouth, dental cleaning, one crown recemented ;  Surgeon: Lea Hopson DDS;  Location: [facility] OR     NO HISTORY OF SURGERY       FAMILY HISTORY  History reviewed. No pertinent family history.  SOCIAL HISTORY  Social History     Tobacco Use     Smoking status: Former Smoker     Smokeless tobacco: Never Used   Substance Use Topics     Alcohol use: No     Alcohol/week: 0.0 standard drinks     Comment: Nov 9, 2016     MEDICATIONS  No current facility-administered  medications for this encounter.      Current Outpatient Medications   Medication     ARIPiprazole (ABILIFY) 15 MG tablet     FLUoxetine (PROZAC) 20 MG capsule     Cholecalciferol (VITAMIN D3) 2000 units CAPS     dolutegravir (TIVICAY) 50 MG tablet     emtricitabine-tenofovir (TRUVADA) 200-300 MG per tablet     etonogestrel (IMPLANON/NEXPLANON) 68 MG IMPL     hydrOXYzine (ATARAX) 50 MG tablet     lisdexamfetamine (VYVANSE) 70 MG capsule     ondansetron (ZOFRAN-ODT) 4 MG ODT tab     ROBAFEN 100 MG/5ML SYRP     Sodium Fluoride (PREVIDENT 5000 BOOSTER PLUS) 1.1 % PSTE     TOPIRAMATE PO     ALLERGIES  No Known Allergies       Review of Systems   Constitutional: Negative.    HENT: Negative.    Eyes: Negative.    Respiratory: Negative.    Cardiovascular: Negative.    Gastrointestinal: Negative.    Endocrine: Negative.    Genitourinary: Negative.    Musculoskeletal: Negative.    Neurological: Negative.    Psychiatric/Behavioral: Positive for behavioral problems and suicidal ideas. Negative for hallucinations. The patient is not hyperactive.    All other systems reviewed and are negative.        Physical Exam   BP: 118/47  Pulse: 93  Temp: 98.3  F (36.8  C)  Resp: 16  SpO2: 100 %  Physical Exam  Vitals signs and nursing note reviewed.   HENT:      Head: Normocephalic.   Neck:      Musculoskeletal: Normal range of motion.   Pulmonary:      Effort: Pulmonary effort is normal.   Abdominal:      General: Abdomen is flat.   Musculoskeletal: Normal range of motion.   Neurological:      General: No focal deficit present.      Mental Status: She is alert.   Psychiatric:         Attention and Perception: Attention and perception normal. She does not perceive auditory or visual hallucinations.         Mood and Affect: Mood and affect normal.         Speech: Speech normal.         Behavior: Behavior normal. Behavior is cooperative.         Thought Content: Thought content normal. Thought content is not paranoid or delusional. Thought  content does not include homicidal or suicidal ideation.         Cognition and Memory: Cognition and memory normal.         Judgment: Judgment normal. Judgment is not impulsive or inappropriate.         ED Course      Procedures             Results for orders placed or performed during the hospital encounter of 08/20/20   Drug abuse screen 6 urine (tox)     Status: None   Result Value Ref Range    Amphetamine Qual Urine Negative NEG^Negative    Barbiturates Qual Urine Negative NEG^Negative    Benzodiazepine Qual Urine Negative NEG^Negative    Cannabinoids Qual Urine Negative NEG^Negative    Cocaine Qual Urine Negative NEG^Negative    Ethanol Qual Urine Negative NEG^Negative    Opiates Qualitative Urine Negative NEG^Negative   HCG qualitative urine     Status: None   Result Value Ref Range    HCG Qual Urine Negative NEG^Negative     Medications   OLANZapine zydis (zyPREXA) ODT tab 10 mg (10 mg Oral Given 8/20/20 2159)        Assessments & Plan (with Medical Decision Making)   Patient with history of Asperger's, bipolar disorder and borderline personality who has poor coping skills and low frustration tolerance who is presently in an IRTS program for continued stabilization after her recent 5 week stay in a psychiatric burch. She got upset when she learned her mother cannot visit her due to COVID restrictions. She was angry and wanted to no longer be in such a setting. She now feels better and regrets her impulsive decision of threatening suicide. Patient was given a dose of Zyprexa here which appeared to help her feel calmer. She is ready to return to [facility]. Staff there is comfortable taking her back. Her mother who is her guardian consents. She will be transported back by ambulance.    I have reviewed the nursing notes. I have reviewed the findings, diagnosis, plan and need for follow up with the patient.    New Prescriptions    No medications on file       Final diagnoses:   Borderline personality disorder (H)    History of Asperger's syndrome   History of bipolar disorder       --  Samuel Medellin MD  [facility], [place], EMERGENCY DEPARTMENT  8/20/2020     Samuel Medellin MD  08/20/20 3311

## 2020-08-21 NOTE — PROGRESS NOTES
Clinic Care Coordination Contact    Situation: Patient chart reviewed by care coordinator.    Background: Patient identified as potential for care coordination due to number of ED visits.    Assessment: Patient to the ED for suicidal ideation.  Upon chart review patient has not been to [place] primary care provider in over a year.  She also lives in an [facility] with multiple supports.    Plan/Recommendations: Patient does not qualify for clinic care coordination due to not being in a [place] primary care provider for over a year.  Will not reach out at this time.    FÉLIX Diamond, [place] [facility] - Care Coordinator   [facility]  8/21/2020   7:06 AM  [phone]

## 2020-08-25 ENCOUNTER — DOCUMENTATION ONLY (OUTPATIENT)
Dept: OTHER | Facility: CLINIC | Age: 25
End: 2020-08-25

## 2020-08-25 ENCOUNTER — AMBULATORY - HEALTHEAST (OUTPATIENT)
Dept: OTHER | Facility: CLINIC | Age: 25
End: 2020-08-25

## 2020-10-05 ENCOUNTER — AMBULATORY - HEALTHEAST (OUTPATIENT)
Dept: OTHER | Facility: CLINIC | Age: 25
End: 2020-10-05

## 2020-10-05 ENCOUNTER — DOCUMENTATION ONLY (OUTPATIENT)
Dept: OTHER | Facility: CLINIC | Age: 25
End: 2020-10-05

## 2020-10-08 ENCOUNTER — TELEPHONE (OUTPATIENT)
Dept: PSYCHIATRY | Facility: CLINIC | Age: 25
End: 2020-10-08

## 2020-10-08 NOTE — TELEPHONE ENCOUNTER
PSYCHIATRY CLINIC PHONE INTAKE     SERVICES REQUESTED / INTERESTED IN          Med Management    Presenting Problem and Brief History                              What would you like to be seen for? (brief description):  Patient was prescribed vyvanse for impulse control but it was discontinued which may have led to behavioral problems. She is currently prescribed abilify and PRN hydroxyzine. Patient started at [facility] which is an alcohol treatment center but she walked out after a couple days. Patient's current living situation is undetermined by mother/guardian because the patient will not tell her where she lives. Patient was adopted at 9 months old and patient's mother does not have any family history from biological family. Patient has a  with Shira Howell, who is very helpful. While patient was at [facility] [place], patient's mother began the process of starting a commitment but [facility] [place] discharged her before anything could be done. She is still very interested in pursuing that commitment.  Have you received a mental health diagnosis? Yes   Which one (s): Bipolar, Borderline Personality Disorder, ADD/ADHD, possible PTSD (6 deaths in family in span of 2 years)  Is there any history of developmental delay?  Yes   Are you currently seeing a mental health provider?  Yes            Who / month last seen:  Was receiving therapy through a private practice clinic in [facility] but missed too many appointments. Meds most recently prescribed by [facility] [place] but prior to that from Loli Alcantara at [facility]  Do you have mental health records elsewhere?  Yes  Will you sign a release so we can obtain them?  Yes    Have you ever been hospitalized for psychiatric reasons?  Yes  Describe:  Most of August 2020 at [facility] [place] for 30+ days and about a year and a half ago in [place]    Do you have current thoughts of self-harm?  Yes    Do you currently have thoughts of harming others?  No     Substance Use History     Do you  have any history of alcohol / illicit drug use?  Yes  Describe:  Alcohol and drugs (meth in past)   Have you ever received treatment for this?  Yes    Describe:  4 years ago was on commitment at [place].      Social History     Who is the patient's a guardian?  Yes    Name / number: Mother, She Mesa ([phone])  Have you had an ACT team in last 12 months?  No  Describe:    Do you have any current or past legal issues?  No  Describe:    OK to leave a detailed voicemail?  Yes    Medical/ Surgical History                                   Patient Active Problem List   Diagnosis     Adjustment disorder with depressed mood     Asperger's syndrome     Attention deficit hyperactivity disorder (ADHD), combined type     Bipolar affective disorder in remission (H)     Depression     Morbid obesity (H)     PMDD (premenstrual dysphoric disorder)     Nexplanon in place          Medications             Current Outpatient Medications   Medication Sig Dispense Refill     ARIPiprazole (ABILIFY) 15 MG tablet Take 20 mg by mouth daily   5     Cholecalciferol (VITAMIN D3) 2000 units CAPS TAKE ONE  CAPSULE BY MOUTH DAILY 30 capsule 10     dolutegravir (TIVICAY) 50 MG tablet Take 50 mg by mouth daily       emtricitabine-tenofovir (TRUVADA) 200-300 MG per tablet Take 1 tablet by mouth daily       etonogestrel (IMPLANON/NEXPLANON) 68 MG IMPL 1 each by Subdermal route once       FLUoxetine (PROZAC) 20 MG capsule Take 1 capsule (20 mg) by mouth daily 30 capsule 1     hydrOXYzine (ATARAX) 50 MG tablet Take 50 mg by mouth prn       lisdexamfetamine (VYVANSE) 70 MG capsule Take 1 capsule (70 mg) by mouth every morning 30 capsule 0     ondansetron (ZOFRAN-ODT) 4 MG ODT tab Take 4 mg by mouth every 8 hours as needed for nausea       ROBAFEN 100 MG/5ML SYRP 2 TEASPOONSFUL BY MOUTH EVERY FOUR  HOURS AS NEEDED 118 mL 1     Sodium Fluoride (PREVIDENT 5000 BOOSTER PLUS) 1.1 % PSTE Apply 1 Dose to affected area At Bedtime 1 Tube 11      TOPIRAMATE PO Take 200 mg by mouth At Bedtime           DISPOSITION      Completed phone screen with patient's mother/guardian. Sent to Dr. Montanez and Vita Henson for review. Guardianship documentation already confirmed by [employer].    Jenni Orozco,

## 2025-02-09 ENCOUNTER — APPOINTMENT (OUTPATIENT)
Dept: CT IMAGING | Facility: CLINIC | Age: 30
End: 2025-02-09
Attending: EMERGENCY MEDICINE
Payer: COMMERCIAL

## 2025-02-09 ENCOUNTER — HOSPITAL ENCOUNTER (EMERGENCY)
Facility: CLINIC | Age: 30
Discharge: HOME | End: 2025-02-10
Attending: EMERGENCY MEDICINE | Admitting: EMERGENCY MEDICINE
Payer: COMMERCIAL

## 2025-02-09 VITALS
OXYGEN SATURATION: 98 % | WEIGHT: 293 LBS | BODY MASS INDEX: 43.4 KG/M2 | HEIGHT: 69 IN | SYSTOLIC BLOOD PRESSURE: 117 MMHG | HEART RATE: 85 BPM | TEMPERATURE: 97.8 F | RESPIRATION RATE: 20 BRPM | DIASTOLIC BLOOD PRESSURE: 83 MMHG

## 2025-02-09 DIAGNOSIS — M54.9 ACUTE LEFT-SIDED BACK PAIN, UNSPECIFIED BACK LOCATION: ICD-10-CM

## 2025-02-09 PROCEDURE — 74176 CT ABD & PELVIS W/O CONTRAST: CPT

## 2025-02-09 PROCEDURE — 99285 EMERGENCY DEPT VISIT HI MDM: CPT | Mod: 25

## 2025-02-09 PROCEDURE — 250N000011 HC RX IP 250 OP 636: Performed by: EMERGENCY MEDICINE

## 2025-02-09 PROCEDURE — 250N000013 HC RX MED GY IP 250 OP 250 PS 637: Performed by: EMERGENCY MEDICINE

## 2025-02-09 PROCEDURE — 96372 THER/PROPH/DIAG INJ SC/IM: CPT | Performed by: EMERGENCY MEDICINE

## 2025-02-09 RX ORDER — KETOROLAC TROMETHAMINE 15 MG/ML
15 INJECTION, SOLUTION INTRAMUSCULAR; INTRAVENOUS ONCE
Status: COMPLETED | OUTPATIENT
Start: 2025-02-09 | End: 2025-02-09

## 2025-02-09 RX ORDER — ONDANSETRON 4 MG/1
4 TABLET, ORALLY DISINTEGRATING ORAL ONCE
Status: COMPLETED | OUTPATIENT
Start: 2025-02-09 | End: 2025-02-09

## 2025-02-09 RX ORDER — HYDROCODONE BITARTRATE AND ACETAMINOPHEN 5; 325 MG/1; MG/1
2 TABLET ORAL ONCE
Status: COMPLETED | OUTPATIENT
Start: 2025-02-09 | End: 2025-02-09

## 2025-02-09 RX ADMIN — HYDROCODONE BITARTRATE AND ACETAMINOPHEN 2 TABLET: 5; 325 TABLET ORAL at 23:06

## 2025-02-09 RX ADMIN — ONDANSETRON 4 MG: 4 TABLET, ORALLY DISINTEGRATING ORAL at 23:06

## 2025-02-09 RX ADMIN — KETOROLAC TROMETHAMINE 15 MG: 15 INJECTION, SOLUTION INTRAMUSCULAR; INTRAVENOUS at 23:07

## 2025-02-09 ASSESSMENT — COLUMBIA-SUICIDE SEVERITY RATING SCALE - C-SSRS
1. IN THE PAST MONTH, HAVE YOU WISHED YOU WERE DEAD OR WISHED YOU COULD GO TO SLEEP AND NOT WAKE UP?: NO
6. HAVE YOU EVER DONE ANYTHING, STARTED TO DO ANYTHING, OR PREPARED TO DO ANYTHING TO END YOUR LIFE?: NO
2. HAVE YOU ACTUALLY HAD ANY THOUGHTS OF KILLING YOURSELF IN THE PAST MONTH?: NO

## 2025-02-09 ASSESSMENT — ACTIVITIES OF DAILY LIVING (ADL): ADLS_ACUITY_SCORE: 41

## 2025-02-10 RX ORDER — CYCLOBENZAPRINE HCL 10 MG
10 TABLET ORAL 3 TIMES DAILY PRN
Qty: 21 TABLET | Refills: 0 | Status: SHIPPED | OUTPATIENT
Start: 2025-02-10 | End: 2025-02-17

## 2025-02-10 RX ORDER — METHYLPREDNISOLONE 4 MG/1
TABLET ORAL
Qty: 21 TABLET | Refills: 0 | Status: SHIPPED | OUTPATIENT
Start: 2025-02-10

## 2025-02-10 NOTE — ED PROVIDER NOTES
Emergency Department Note      History of Present Illness     Chief Complaint  Flank Pain    HPI  Shaylee Mesa is a 30 year old female who presents to the emergency room with what appears to be left-sided flank pain that started earlier today.  She states that initially it came and went and is now constant.  She states that comes out of her lower thoracic spine and shoots around her left flank and is quite severe.  Worse with sitting or laying down, denies any hematuria or vomiting or fevers.  Denies any trauma.  States that she is never had this before, no urinary incontinence, no saddle anesthesia, the pain does not shoot down her leg.      Independent Historian  Yes mom is at the bedside and confirms the above history    Review of External Notes  Yes I have reviewed the patient's last OB/GYN visit from 5- where the patient was seen at that time after the vaginal delivery of a 35-week old boy.      Past Medical History   Medical History and Problem List  Past Medical History:   Diagnosis Date    ADHD (attention deficit hyperactivity disorder)     Asperger's disorder     Bipolar 1 disorder (H)     Bipolar affective disorder (H)     Chemical dependency (H)     Depressive disorder        Medications  ARIPiprazole (ABILIFY) 15 MG tablet  Cholecalciferol (VITAMIN D3) 2000 units CAPS  dolutegravir (TIVICAY) 50 MG tablet  emtricitabine-tenofovir (TRUVADA) 200-300 MG per tablet  etonogestrel (IMPLANON/NEXPLANON) 68 MG IMPL  FLUoxetine (PROZAC) 20 MG capsule  hydrOXYzine (ATARAX) 50 MG tablet  lisdexamfetamine (VYVANSE) 70 MG capsule  ondansetron (ZOFRAN-ODT) 4 MG ODT tab  ROBAFEN 100 MG/5ML SYRP  Sodium Fluoride (PREVIDENT 5000 BOOSTER PLUS) 1.1 % PSTE  TOPIRAMATE PO        Surgical History   Past Surgical History:   Procedure Laterality Date    EXAM UNDER ANESTHESIA, RESTORATIONS, EXTRACTION(S) DENTAL COMPLEX, COMBINED N/A 4/28/2017    Procedure: COMBINED EXAM UNDER ANESTHESIA, RESTORATIONS, EXTRACTION(S)  "DENTAL COMPLEX;  Dental Exam Under Anesthesia, X-Rays, Restorations x 15, Extractions x 2 , Root Canal x 3 ,Flouride Varnish in the Mouth, dental cleaning, one crown recemented ;  Surgeon: Lea Hopson DDS;  Location: [facility] OR    NO HISTORY OF SURGERY           Physical Exam   Patient Vitals for the past 24 hrs:   BP Temp Pulse Resp SpO2 Height Weight   02/09/25 2240 117/83 97.8  F (36.6  C) 85 20 98 % 1.753 m (5' 9\") (!) 158.8 kg (350 lb)       Physical Exam  Vitals: reviewed by me  General: Pt seen on [facility], pleasant, cooperative, and alert to conversation, appears to be in pain  Eyes: Tracking well, clear conjunctiva BL  ENT: MMM, midline trachea.   Lungs: No tachypnea, no accessory muscle use. No respiratory distress.   CV: Rate as above  Abd: Soft, non tender, no guarding, no rebound. Non distended.  However does have midline paraspinal tenderness to the lower thoracic paraspinal musculature as well as mild CVA tenderness on the left side.  This exam is limited by body habitus.  MSK: no joint effusion.  No evidence of trauma  Skin: No rash  Neuro: Clear speech and no facial droop.  Psych: Not RIS, no e/o AH/VH      Diagnostics   Lab Results   Labs Ordered and Resulted from Time of ED Arrival to Time of ED Departure - No data to display    Imaging  Abd/pelvis CT no contrast - Stone Protocol   Final Result   IMPRESSION:    1.  No etiology for symptoms evident.                 ED Course      Medications Administered   Medications   ketorolac (TORADOL) injection 15 mg (15 mg Intramuscular $Given 2/9/25 2307)   HYDROcodone-acetaminophen (NORCO) 5-325 MG per tablet 2 tablet (2 tablets Oral $Given 2/9/25 2306)   ondansetron (ZOFRAN ODT) ODT tab 4 mg (4 mg Oral $Given 2/9/25 2306)            Optional/Additional Documentation  Stress/Adjustment Disorders       Medical Decision Making / Diagnosis       MDM  This is a very pleasant 30-year-old female who presents to the emergency room with 1 day of back " pain.  It certainly sounds like it may be musculoskeletal since it is positional and may be worse when she holds heavier objects like her child, but I did also consider possible kidney stone given the location and thankfully the CT scan does not show any obvious abnormality.  The thoracic spine is grossly intact of the CT as well and I do not see any signs of cord compression, her pain is certainly not intractable as she is doing well with oral medications.  We discussed how if this is a herniated disc, that she may benefit from a Medrol Dosepak and muscle relaxer and she feels comfortable with this.  It also sounds like he is having issues with following with her regular doctor and they did ask me to place a referral for primary care doctor in the next 1 week and I have done that and asked them to answer their phones.  Her vital signs are reassuring, she is mentating appropriately and has good family support.  I do not see any benefit to having her come into the hospital since all of her pain is essentially been controlled with oral medications, therefore will plan for discharge as above.    ICD-10 Codes:    ICD-10-CM    1. Acute left-sided back pain, unspecified back location  M54.9 Primary Care Referral             Discharge Medications  Discharge Medication List as of 2/10/2025 12:24 AM        START taking these medications    Details   cyclobenzaprine (FLEXERIL) 10 MG tablet Take 1 tablet (10 mg) by mouth 3 times daily as needed., Disp-21 tablet, R-0, Local Print      methylPREDNISolone (MEDROL DOSEPAK) 4 MG tablet therapy pack Follow Package Directions, Disp-21 tablet, R-0, Local Print                        Artie Westbrook MD  02/10/25 0134

## 2025-02-10 NOTE — ED TRIAGE NOTES
Patient here  with left flank pain which started this morning, she also c/o nausea     Triage Assessment (Adult)       Row Name 02/09/25 7293          Triage Assessment    Airway WDL WDL        Respiratory WDL    Respiratory WDL WDL        Skin Circulation/Temperature WDL    Skin Circulation/Temperature WDL WDL        Cardiac WDL    Cardiac WDL WDL        Peripheral/Neurovascular WDL    Peripheral Neurovascular WDL WDL        Cognitive/Neuro/Behavioral WDL    Cognitive/Neuro/Behavioral WDL WDL

## 2025-02-10 NOTE — DISCHARGE INSTRUCTIONS
As we discussed it does look like you have some pain in your back that is acute but the CT scan does not show us any specific reason why.  This may in fact be due to a small herniated disc since it is worse when you carry things, so please take the medication that we have given you to help with this and do come back to the ER immediately with any other concerns you have.  Please do check in with your regular doctor next week, this is very important.

## 2025-02-27 ENCOUNTER — OFFICE VISIT (OUTPATIENT)
Dept: FAMILY MEDICINE | Facility: CLINIC | Age: 30
End: 2025-02-27
Payer: COMMERCIAL

## 2025-02-27 VITALS
SYSTOLIC BLOOD PRESSURE: 120 MMHG | BODY MASS INDEX: 43.4 KG/M2 | RESPIRATION RATE: 16 BRPM | OXYGEN SATURATION: 100 % | DIASTOLIC BLOOD PRESSURE: 77 MMHG | HEART RATE: 98 BPM | TEMPERATURE: 97.3 F | WEIGHT: 293 LBS | HEIGHT: 69 IN

## 2025-02-27 DIAGNOSIS — R63.8 UNABLE TO LOSE WEIGHT: Primary | ICD-10-CM

## 2025-02-27 DIAGNOSIS — M54.50 ACUTE LEFT-SIDED LOW BACK PAIN WITHOUT SCIATICA: ICD-10-CM

## 2025-02-27 DIAGNOSIS — E66.01 CLASS 3 SEVERE OBESITY DUE TO EXCESS CALORIES WITHOUT SERIOUS COMORBIDITY WITH BODY MASS INDEX (BMI) OF 50.0 TO 59.9 IN ADULT (H): ICD-10-CM

## 2025-02-27 DIAGNOSIS — E66.813 CLASS 3 SEVERE OBESITY DUE TO EXCESS CALORIES WITHOUT SERIOUS COMORBIDITY WITH BODY MASS INDEX (BMI) OF 50.0 TO 59.9 IN ADULT (H): ICD-10-CM

## 2025-02-27 PROCEDURE — 99203 OFFICE O/P NEW LOW 30 MIN: CPT | Performed by: INTERNAL MEDICINE

## 2025-02-27 PROCEDURE — 3074F SYST BP LT 130 MM HG: CPT | Performed by: INTERNAL MEDICINE

## 2025-02-27 PROCEDURE — 3078F DIAST BP <80 MM HG: CPT | Performed by: INTERNAL MEDICINE

## 2025-02-27 PROCEDURE — G2211 COMPLEX E/M VISIT ADD ON: HCPCS | Performed by: INTERNAL MEDICINE

## 2025-02-27 PROCEDURE — 1126F AMNT PAIN NOTED NONE PRSNT: CPT | Performed by: INTERNAL MEDICINE

## 2025-02-27 ASSESSMENT — PAIN SCALES - GENERAL: PAINLEVEL_OUTOF10: NO PAIN (0)

## 2025-02-27 NOTE — PROGRESS NOTES
"  Assessment & Plan   Unable to lose weight  Class 3 severe obesity due to excess calories without serious comorbidity with body mass index (BMI) of 50.0 to 59.9 in adult (H)  - symptoms not well controlled  - semaglutide-weight management (WEGOVY) 0.25 MG/0.5ML pen  Dispense: 2 mL; Refill: 1  - Med Therapy Management Referral    Acute left-sided low back pain  - symptoms resolved  - diet, exercise      MED REC REQUIRED   Post Medication Reconciliation Status:  Discharge medications reconciled and changed, see notes/orders  BMI  Estimated body mass index is 57.45 kg/m  as calculated from the following:    Height as of this encounter: 1.753 m (5' 9\").    Weight as of this encounter: 176.4 kg (389 lb).   Weight management plan: Discussed healthy diet and exercise guidelines      FUTURE APPOINTMENTS:       - Follow-up visit in 3 months    Maria Luz Ca is a 30 year old, presenting for the following health issues:  ER F/U    HPI       ED/UC Followup:    Facility:  [facility]  Date of visit: 02/09/2025  Reason for visit:     Acute left-sided back pain, unspecified back location         Current Status: stable    Current Outpatient Medications   Medication Sig Dispense Refill     ARIPiprazole (ABILIFY) 15 MG tablet Take 20 mg by mouth daily   5     Cholecalciferol (VITAMIN D3) 2000 units CAPS TAKE ONE  CAPSULE BY MOUTH DAILY 30 capsule 10     dolutegravir (TIVICAY) 50 MG tablet Take 50 mg by mouth daily       emtricitabine-tenofovir (TRUVADA) 200-300 MG per tablet Take 1 tablet by mouth daily       etonogestrel (IMPLANON/NEXPLANON) 68 MG IMPL 1 each by Subdermal route once       FLUoxetine (PROZAC) 20 MG capsule Take 1 capsule (20 mg) by mouth daily 30 capsule 1     hydrOXYzine (ATARAX) 50 MG tablet Take 50 mg by mouth prn       lisdexamfetamine (VYVANSE) 70 MG capsule Take 1 capsule (70 mg) by mouth every morning 30 capsule 0     methylPREDNISolone (MEDROL DOSEPAK) 4 MG tablet therapy " pack Follow Package Directions 21 tablet 0     ondansetron (ZOFRAN-ODT) 4 MG ODT tab Take 4 mg by mouth every 8 hours as needed for nausea       ROBAFEN 100 MG/5ML SYRP 2 TEASPOONSFUL BY MOUTH EVERY FOUR  HOURS AS NEEDED 118 mL 1     semaglutide-weight management (WEGOVY) 0.25 MG/0.5ML pen Inject 0.5 mLs (0.25 mg) subcutaneously once a week. 2 mL 1     Sodium Fluoride (PREVIDENT 5000 BOOSTER PLUS) 1.1 % PSTE Apply 1 Dose to affected area At Bedtime 1 Tube 11     TOPIRAMATE PO Take 200 mg by mouth At Bedtime       No current facility-administered medications for this visit.     Past Medical History:   Diagnosis Date     ADHD (attention deficit hyperactivity disorder)      Asperger's disorder      Bipolar 1 disorder (H)      Bipolar affective disorder (H)      Chemical dependency (H)      Depressive disorder      Social History     Socioeconomic History     Marital status: Single     Spouse name: Not on file     Number of children: Not on file     Years of education: Not on file     Highest education level: Not on file   Occupational History     Not on file   Tobacco Use     Smoking status: Former     Smokeless tobacco: Never   Vaping Use     Vaping status: Never Used   Substance and Sexual Activity     Alcohol use: No     Alcohol/week: 0.0 standard drinks of alcohol     Comment: Nov 9, 2016     Drug use: No     Types: Marijuana     Comment: July 9 2020     Sexual activity: Not Currently     Partners: Male     Birth control/protection: Inserts   Other Topics Concern     Parent/sibling w/ CABG, MI or angioplasty before 65F 55M? Not Asked   Social History Narrative     Not on file     Social Drivers of Health     Financial Resource Strain: Low Risk  (2/27/2025)    Financial Resource Strain      Within the past 12 months, have you or your family members you live with been unable to get utilities (heat, electricity) when it was really needed?: No   Food Insecurity: Low Risk  (2/27/2025)    Food Insecurity      Within the  "past 12 months, did you worry that your food would run out before you got money to buy more?: No      Within the past 12 months, did the food you bought just not last and you didn t have money to get more?: No   Transportation Needs: Low Risk  (2/27/2025)    Transportation Needs      Within the past 12 months, has lack of transportation kept you from medical appointments, getting your medicines, non-medical meetings or appointments, work, or from getting things that you need?: No   Physical Activity: Not on file   Stress: Not on file   Social Connections: Socially Integrated (4/6/2024)    Received from [employer] [facility]    Social Connections      Do you often feel lonely or isolated from those around you?: 0   Interpersonal Safety: Not on file   Housing Stability: Low Risk  (2/27/2025)    Housing Stability      Do you have housing? : Yes      Are you worried about losing your housing?: No             Review of Systems  Constitutional, HEENT, cardiovascular, pulmonary, GI, , musculoskeletal, neuro, skin, endocrine and psych systems are negative, except as otherwise noted.      Objective    /77   Pulse 98   Temp 97.3  F (36.3  C) (Temporal)   Resp 16   Ht 1.753 m (5' 9\")   Wt (!) 176.4 kg (389 lb)   SpO2 100%   BMI 57.45 kg/m    Body mass index is 57.45 kg/m .  Physical Exam   GENERAL: alert and no distress  EYES: Eyes grossly normal to inspection, conjunctivae and sclerae normal  HENT: normocephalic atraumatic  NECK: no asymmetry, masses, or scars  RESP: lungs clear to auscultation - no rales, rhonchi or wheezes  CV: regular rate and rhythm, normal S1 S2  ABDOMEN: nondistended  MS: no gross musculoskeletal defects noted, no edema  SKIN: no suspicious lesions or rashes  NEURO: Normal strength and tone, mentation intact  PSYCH: flat affect    Labs reviewed in Epic          The longitudinal plan of care for the diagnosis(es)/condition(s) as documented were addressed during this " visit. Due to the added complexity in care, I will continue to support Lanny in the subsequent management and with ongoing continuity of care.      Signed Electronically by: Deyanira Hicks MD

## 2025-02-28 ENCOUNTER — TELEPHONE (OUTPATIENT)
Dept: FAMILY MEDICINE | Facility: CLINIC | Age: 30
End: 2025-02-28
Payer: COMMERCIAL

## 2025-02-28 DIAGNOSIS — E66.01 CLASS 3 SEVERE OBESITY DUE TO EXCESS CALORIES WITHOUT SERIOUS COMORBIDITY WITH BODY MASS INDEX (BMI) OF 50.0 TO 59.9 IN ADULT (H): ICD-10-CM

## 2025-02-28 DIAGNOSIS — R63.8 UNABLE TO LOSE WEIGHT: Primary | ICD-10-CM

## 2025-02-28 DIAGNOSIS — E66.813 CLASS 3 SEVERE OBESITY DUE TO EXCESS CALORIES WITHOUT SERIOUS COMORBIDITY WITH BODY MASS INDEX (BMI) OF 50.0 TO 59.9 IN ADULT (H): ICD-10-CM

## 2025-02-28 NOTE — TELEPHONE ENCOUNTER
Retail Pharmacy Prior Authorization Team   Phone: [phone]    PRIOR AUTHORIZATION DENIED    Medication: WEGOVY 0.25 MG/0.5ML SC SOAJ  Insurance Company: [employer] - Phone [phone] Fax [phone]  Denial Date: 2/28/2025  Denial Reason(s): WEIGHT LOSS DRUGS ARE EXCLUDED FROM COVERAGE  Appeal Information: N/A  Patient Notified: NO

## 2025-03-02 ENCOUNTER — HEALTH MAINTENANCE LETTER (OUTPATIENT)
Age: 30
End: 2025-03-02

## 2025-03-03 ENCOUNTER — TELEPHONE (OUTPATIENT)
Dept: PHARMACY | Facility: OTHER | Age: 30
End: 2025-03-03
Payer: COMMERCIAL

## 2025-03-03 NOTE — TELEPHONE ENCOUNTER
MTM referral from: [facility] visit (referral by provider)    MTM referral outreach attempt #2 on March 3, 2025 at 2:56 PM      Outcome: Patient not reachable after several attempts, sent [employer] message    Use ucare part d for the carrier/Plan on the flowsheet      [employer] Message Sent    ZAHEER Eldridge   [phone]

## 2025-03-04 ENCOUNTER — TRANSCRIBE ORDERS (OUTPATIENT)
Dept: OTHER | Age: 30
End: 2025-03-04

## 2025-03-04 DIAGNOSIS — R87.810 ASCUS WITH POSITIVE HIGH RISK HPV CERVICAL: Primary | ICD-10-CM

## 2025-03-04 DIAGNOSIS — R87.610 ASCUS WITH POSITIVE HIGH RISK HPV CERVICAL: Primary | ICD-10-CM

## 2025-04-10 DIAGNOSIS — Z01.812 PRE-PROCEDURE LAB EXAM: Primary | ICD-10-CM

## 2025-04-16 ENCOUNTER — VIRTUAL VISIT (OUTPATIENT)
Dept: PHARMACY | Facility: CLINIC | Age: 30
End: 2025-04-16
Payer: COMMERCIAL

## 2025-04-16 DIAGNOSIS — Z97.5 NEXPLANON IN PLACE: ICD-10-CM

## 2025-04-16 DIAGNOSIS — F31.70 BIPOLAR AFFECTIVE DISORDER IN REMISSION: ICD-10-CM

## 2025-04-16 DIAGNOSIS — E66.01 MORBID OBESITY (H): Primary | ICD-10-CM

## 2025-04-16 DIAGNOSIS — F90.2 ATTENTION DEFICIT HYPERACTIVITY DISORDER (ADHD), COMBINED TYPE: ICD-10-CM

## 2025-04-16 PROCEDURE — 99605 MTMS BY PHARM NP 15 MIN: CPT | Mod: 95

## 2025-04-16 PROCEDURE — 99607 MTMS BY PHARM ADDL 15 MIN: CPT | Mod: 95

## 2025-04-16 NOTE — LETTER
_  Medication List        Prepared on: Apr 16, 2025     Bring your Medication List when you go to the doctor, hospital, or   emergency room. And, share it with your family or caregivers.     Note any changes to how you take your medications.  Cross out medications when you no longer use them.    Medication How I take it Why I use it Prescriber   ARIPiprazole ER (ABILIFY MAINTENA) 400 MG extended release injection Inject 400 mg into the muscle every 28 days.  Bipolar Disorder Patient Reported   etonogestrel (IMPLANON/NEXPLANON) 68 MG IMPL 1 each by Subdermal route once  Contraception Patient Reported   FLUoxetine (PROZAC) 20 MG capsule Take 1 capsule (20 mg) by mouth daily PMDD (Premenstrual Dysphoric Disorder) Aba Watkins MD   lisdexamfetamine (VYVANSE) 70 MG capsule Take 1 capsule (70 mg) by mouth every morning  ADHD Joann Bonds, APRN CNP   semaglutide-weight management (WEGOVY) 0.25 MG/0.5ML pen Inject 0.5 mLs (0.25 mg) subcutaneously once a week. Class 3 severe obesity due to excess calories without serious comorbidity with body mass index (BMI) of 50.0 to 59.9 in adult (H) Deyanira Hicks MD   semaglutide-weight management (WEGOVY) 0.25 MG/0.5ML pen Inject 0.5 mLs (0.25 mg) subcutaneously once a week. Unable to lose weight; Class 3 severe obesity due to excess calories without serious comorbidity with body mass index (BMI) of 50.0 to 59.9 in adult (H) Deyanira Hicks MD         Add new medications, over-the-counter drugs, herbals, vitamins, or  minerals in the blank rows below.    Medication How I take it Why I use it Prescriber                                      Allergies:      No Known Allergies        Side effects I have had:      No Known Side Effects        Other Information:              My notes and questions:

## 2025-04-16 NOTE — LETTER
April 16, 2025  Shaylee Mesa  [address] [facility] [address]    Dear Ms. Mesa, LYLY [facility]     Thank you for talking with me on Apr 16, 2025 about your health and medications. As a follow-up to our conversation, I have included two documents:      Your Recommended To-Do List has steps you should take to get the best results from your medications.  Your Medication List will help you keep track of your medications and how to take them.    If you want to talk about these documents, please call Portia Carvajal RPH at phone: [phone], Monday-Friday 8-4:30pm.    I look forward to working with you and your doctors to make sure your medications work well for you.    Sincerely,  Portia Carvajal RPH  [facility] Pharmacist, [facility]

## 2025-04-16 NOTE — PATIENT INSTRUCTIONS
"Recommendations from today's MTM visit:                                                    MTM (medication therapy management) is a service provided by a clinical pharmacist designed to help you get the most of out of your medicines.   Today we reviewed what your medicines are for, how to know if they are working, that your medicines are safe and how to make your medicine regimen as easy as possible.      I will try again to get coverage for your Wegovy through the Medicaid portion of your insurance plan.    Follow-up: Laura when I hear back about your Wegovy coverage    It was great speaking with you today.  I value your experience and would be very thankful for your time in providing feedback in our clinic survey. In the next few days, you may receive an email or text message from [employer] with a link to a survey related to your  clinical pharmacist.\"     To schedule another MTM appointment, please call the clinic directly or you may call the MTM scheduling line at [phone] or toll-free at [phone].     My Clinical Pharmacist's contact information:                                                      Please feel free to contact me with any questions or concerns you have.      Portia Carvajal, PharmD  Medication Therapy Management Pharmacy Resident  [facility]  [phone]    Gabriela Ashley PharmD, [facility]  Medication Therapy Management Provider  [facility]  [phone]    "

## 2025-04-16 NOTE — LETTER
"Recommended To-Do List      Prepared on: Apr 16, 2025       You can get the best results from your medications by completing the items on this \"To-Do List.\"      Bring your To-Do List when you go to your doctor. And, share it with your family or caregivers.    My To-Do List:  What we talked about: What I should do:   The cost of your medication(s)    I will try again to get coverage for your Wegovy          What we talked about: What I should do:                     "

## 2025-04-16 NOTE — PROGRESS NOTES
Medication Therapy Management (MTM) Encounter    ASSESSMENT:                            Medication Adherence/Access: would benefit from another attempt at a prior authorization as patient does meet criteria for Medicaid coverage of Wegovy    Weight Management   Patient meets criteria for pharmacotherapy due to BMI > 30 kg/m2. GLP1 agonist would likely be effective if able to be covered by insurance. Would not use phentermine due to patient already taking a stimulant for ADHD. Would be cautious with bupropion due to patient taking fluoxetine.    Mental Health   Bipolar and ADHD  Stable.     Contraception  Stable.    PLAN:                            I will try again to get coverage for your Wegovy through the Medicaid portion of your insurance plan.    Follow-up: Laura when I hear back about your Wegovy coverage    SUBJECTIVE/OBJECTIVE:                          Lanny Mesa is a 30 year old female contacted via secure video for an initial visit. She was referred to me from Dr. Hicks.      Reason for visit: Weight management.    Allergies/ADRs: Reviewed in chart  Past Medical History: Reviewed in chart  Tobacco: She reports that she has been smoking cigarettes. She has never used smokeless tobacco. Smoking 2-3 cigarettes per day  Alcohol: none  Caffeine: 2-3 cans of soda per day    Medication Adherence/Access: Patient uses pill box(es).  Patient takes medications 1 time(s) per day.   Per patient, misses medication 0 time(s) per week.   Patient's PA for Wegovy was denied    Weight Management   Wegovy 0.25 mg once weekly - has not started yet, working on insurance coverage  Nutrition/Eating Habits: states that she is eating a lot of junk food, knows that she needs to make changes to include more protein, fiber, fruits, and vegetables in her diet.  Exercise/Activity: walking quite a bit with friends and her child.  Medications Tried/Failed:  None.     Initial Consult Weight: 389 lbs       Wt Readings from Last 4  "Encounters:   02/27/25 (!) 389 lb (176.4 kg)   02/09/25 (!) 350 lb (158.8 kg)   08/10/18 293 lb (132.9 kg)   07/16/18 295 lb (133.8 kg)     Estimated body mass index is 57.45 kg/m  as calculated from the following:    Height as of 2/27/25: 5' 9\" (1.753 m).    Weight as of 2/27/25: 389 lb (176.4 kg).      Mental Health   Bipolar and ADHD  Abilify Maintena 400 mg every 28 days  Fluoxetine 20 mg daily  Vyvanse 70 mg daily  Patient reports no current medication side effects.  Patient reports symptoms are stable.     Contraception  Nexplanon in place  Patient reports no concerns    Today's Vitals: There were no vitals taken for this visit.  ----------------    I spent 16 minutes with this patient today. All changes were made via collaborative practice agreement with Deyanira Hicks MD. A copy of the visit note was provided to the patient's provider(s).    A summary of these recommendations was sent via [employer].    Portia Carvajal PharmD  Medication Therapy Management Pharmacy Resident  [facility]    The patient was seen independently by Dr. Carvajal.  I have read the note and agree with the assessment and plan.  Gabriela Ashley PharmD, [facility]  Medication Therapy Management Provider  [phone]      Telemedicine Visit Details  The patient's medications can be safely assessed via a telemedicine encounter.  Type of service:  Video Conference via [employer]  Originating Location (pt. Location): Home    Distant Location (provider location):  On-site  Joined the call at 4/16/2025, 11:32:40 am.  Left the call at 4/16/2025, 11:48:30 am.  You were on the call for 15 minutes 50 seconds.     Medication Therapy Recommendations  Morbid obesity (H)   1 Current Medication: semaglutide-weight management (WEGOVY) 0.25 MG/0.5ML pen   Current Medication Sig: Inject 0.5 mLs (0.25 mg) subcutaneously once a week.   Rationale: Cannot afford medication product - Cost - Adherence   Recommendation: Referral to Service    Status: Resolved " Med Access Issue   Identified Date: 4/16/2025 Completed Date: 4/16/2025   Note: Prior Auth

## 2025-04-17 NOTE — PROGRESS NOTES
"INDICATIONS:                                                    Is a pregnancy test required: Yes.  Was it positive or negative?  {POSITIVENEGATIVE2:918166::\"Negative\"}  Was a consent obtained?  Yes  Shaylee Mesa, is a 30 year old female, who had a recent ASCUS pap.  HPV Other positive Yes prior history of abnormal pap. Here today for colposcopy. Discussed indication, risks of infection and bleeding.    Her last pap was   Lab Results   Component Value Date    PAP NIL 07/03/2017    .    PROCEDURE:                                                      Cervix is stained with acetic acid and viewed colposcopically. Squamocolumnar junction {:9024} visualized in it's entirety. {:728} . Biopsy done {YES / NO:452634::\"Yes\"}. Endocervical curretage {:320912}    {GO TO THE IMAGE SECTION AND DRAW YOUR COLPO FINDINGS UNDER THE IMAGES, DELETE THIS LINK AND TYPE (DOT) IMAGES TO INSERT THE FINDINGS}     POST PROCEDURE:                                                      IMPRESSION: {:627555}    PLAN : Await the results of the biopsies.  She {[facility] Tolerance:880650}. There were no complications. Patient was discharged in stable condition.    Patient advised to call the clinic if excessive bleeding, pelvic pain, or fever.     Follow-up {[facility] followup :304452}.  Monalisa Cosby MD      "

## 2025-05-01 ENCOUNTER — LAB (OUTPATIENT)
Dept: LAB | Facility: CLINIC | Age: 30
End: 2025-05-01
Payer: COMMERCIAL

## 2025-05-01 ENCOUNTER — OFFICE VISIT (OUTPATIENT)
Dept: OBGYN | Facility: CLINIC | Age: 30
End: 2025-05-01
Payer: COMMERCIAL

## 2025-05-01 VITALS
DIASTOLIC BLOOD PRESSURE: 82 MMHG | WEIGHT: 293 LBS | SYSTOLIC BLOOD PRESSURE: 122 MMHG | HEIGHT: 69 IN | BODY MASS INDEX: 43.4 KG/M2

## 2025-05-01 DIAGNOSIS — Z55.8: ICD-10-CM

## 2025-05-01 DIAGNOSIS — R87.810 ASCUS WITH POSITIVE HIGH RISK HPV CERVICAL: Primary | ICD-10-CM

## 2025-05-01 DIAGNOSIS — R87.610 ASCUS WITH POSITIVE HIGH RISK HPV CERVICAL: Primary | ICD-10-CM

## 2025-05-01 DIAGNOSIS — Z01.812 PRE-PROCEDURE LAB EXAM: ICD-10-CM

## 2025-05-01 DIAGNOSIS — Z71.6 ENCOUNTER FOR TOBACCO USE CESSATION COUNSELING: ICD-10-CM

## 2025-05-01 LAB — HCG UR QL: NEGATIVE

## 2025-05-01 SDOH — EDUCATIONAL SECURITY - EDUCATION ATTAINMENT: OTHER PROBLEMS RELATED TO EDUCATION AND LITERACY: Z55.8

## 2025-05-01 NOTE — PROGRESS NOTES
"[facility]  OB/GYN Clinic Note    SUBJECTIVE:                                                   Shaylee Mesa is a 30 year old  female who presents to clinic today for the following health issue(s):  Patient presents with:  Follow Up: Pap only      Additional information: pap only    HPI:  Seen today for follow-up of ASCUS, other HR HPV positive pap. Overdue for colp. Has hx of colp in the past. Has received full HPV vaccine series. Recently stopped smoking.   Was not aware that condom use is advised with Nexplanon to prevent STI transmission.     No LMP recorded. Patient has had an implant..     Patient is not sexually active, No obstetric history on file..  Using nexplanon for contraception.    reports that she has been smoking cigarettes. She has never used smokeless tobacco.  STD testing offered?  Declined  Health maintenance updated:  yes      OBJECTIVE:     /82   Ht 1.753 m (5' 9\")   Wt (!) 171.4 kg (377 lb 12.8 oz)   BMI 55.79 kg/m    Body mass index is 55.79 kg/m .    Exam:  Constitutional:  Appearance: Well nourished, well developed alert, in no acute distress  Pelvic Exam:  External Genitalia:     Normal appearance for age, no discharge present, no tenderness present, no inflammatory lesions present, color normal  Vagina:     Large graves speculum used. Normal vaginal vault without central or paravaginal defects, no discharge present, no inflammatory lesions present, no masses present  Cervix:     Appearance healthy, no lesions present, nontender to palpation, no bleeding present          ASSESSMENT/PLAN:                                                        ICD-10-CM    1. ASCUS with positive high risk HPV cervical  R87.610 Ob/Gyn  Referral    R87.810 Pap Screen Reflex to HPV if ASCUS - Recommended Age 25 - 29 Years      2. Encounter for tobacco use cessation counseling  Z71.6       3. Deficient knowledge of preventive health care  Z55.8     "     Shaylee Mesa is a 30 year old  with ASCUS, other HR HPV Positive pap. Over due for colp. Cotesting done today. Follow-up pending results.   Encouraged continued tobacco cessation.   Advised condom use for prevention of HPV transmission and other STIs.     Monalisa Cosby MD, MHS  [facility] [place]  25

## 2025-05-06 ENCOUNTER — MYC REFILL (OUTPATIENT)
Dept: FAMILY MEDICINE | Facility: CLINIC | Age: 30
End: 2025-05-06
Payer: COMMERCIAL

## 2025-05-06 DIAGNOSIS — E66.813 CLASS 3 SEVERE OBESITY DUE TO EXCESS CALORIES WITHOUT SERIOUS COMORBIDITY WITH BODY MASS INDEX (BMI) OF 50.0 TO 59.9 IN ADULT (H): ICD-10-CM

## 2025-05-06 LAB
BKR LAB AP GYN ADEQUACY: NORMAL
BKR LAB AP GYN INTERPRETATION: NORMAL
BKR LAB AP GYN OTHER FINDINGS: NORMAL
BKR LAB AP HPV REFLEX: NORMAL
BKR LAB AP PREVIOUS ABNL DX: NORMAL
BKR LAB AP PREVIOUS ABNORMAL: NORMAL
PATH REPORT.COMMENTS IMP SPEC: NORMAL
PATH REPORT.COMMENTS IMP SPEC: NORMAL
PATH REPORT.RELEVANT HX SPEC: NORMAL

## 2025-05-09 ENCOUNTER — RESULTS FOLLOW-UP (OUTPATIENT)
Dept: OBGYN | Facility: CLINIC | Age: 30
End: 2025-05-09

## 2025-05-09 DIAGNOSIS — Z11.59 ENCOUNTER FOR SCREENING FOR OTHER VIRAL DISEASES: ICD-10-CM

## 2025-05-09 DIAGNOSIS — R87.810 ASCUS WITH POSITIVE HIGH RISK HPV CERVICAL: Primary | ICD-10-CM

## 2025-05-09 DIAGNOSIS — A59.01 TRICHOMONAL VAGINITIS: ICD-10-CM

## 2025-05-09 DIAGNOSIS — R87.610 ASCUS WITH POSITIVE HIGH RISK HPV CERVICAL: Primary | ICD-10-CM

## 2025-05-12 ENCOUNTER — MYC REFILL (OUTPATIENT)
Dept: OBGYN | Facility: CLINIC | Age: 30
End: 2025-05-12
Payer: COMMERCIAL

## 2025-05-12 DIAGNOSIS — A59.01 TRICHOMONAL VAGINITIS: ICD-10-CM

## 2025-05-12 LAB
HPV HR 12 DNA CVX QL NAA+PROBE: NEGATIVE
HPV16 DNA CVX QL NAA+PROBE: NEGATIVE
HPV18 DNA CVX QL NAA+PROBE: NEGATIVE
HUMAN PAPILLOMA VIRUS FINAL DIAGNOSIS: NORMAL

## 2025-05-12 RX ORDER — METRONIDAZOLE 500 MG/1
2000 TABLET ORAL ONCE
Qty: 4 TABLET | Refills: 0 | Status: SHIPPED | OUTPATIENT
Start: 2025-05-12 | End: 2025-05-12

## 2025-05-12 RX ORDER — METRONIDAZOLE 500 MG/1
500 TABLET ORAL 2 TIMES DAILY
Qty: 14 TABLET | Refills: 0 | OUTPATIENT
Start: 2025-05-12

## 2025-05-12 RX ORDER — METRONIDAZOLE 500 MG/1
500 TABLET ORAL 2 TIMES DAILY
Qty: 14 TABLET | Refills: 0 | Status: SHIPPED | OUTPATIENT
Start: 2025-05-12 | End: 2025-05-19

## 2025-05-12 NOTE — TELEPHONE ENCOUNTER
Requested Prescriptions   Pending Prescriptions Disp Refills    metroNIDAZOLE (FLAGYL) 500 MG tablet 14 tablet 0     Sig: Take 1 tablet (500 mg) by mouth 2 times daily.       There is no refill protocol information for this order        Last Written Prescription Date:  5/12/25  Last Fill Quantity: 14,  # refills: 0   Last office visit: 5/1/2025 ; last virtual visit: Visit date not found with prescribing provider:  Alea    Future Office Visit:      Rx denied, duplicate    Gabriela Balderas RN on 5/12/2025 at 3:00 PM  [facility] Triage

## 2025-05-13 ENCOUNTER — PATIENT OUTREACH (OUTPATIENT)
Dept: OBGYN | Facility: CLINIC | Age: 30
End: 2025-05-13
Payer: COMMERCIAL

## 2025-05-14 ENCOUNTER — HOSPITAL ENCOUNTER (EMERGENCY)
Facility: CLINIC | Age: 30
Discharge: HOME | End: 2025-05-14
Attending: EMERGENCY MEDICINE | Admitting: EMERGENCY MEDICINE
Payer: COMMERCIAL

## 2025-05-14 VITALS
OXYGEN SATURATION: 98 % | TEMPERATURE: 97.9 F | DIASTOLIC BLOOD PRESSURE: 76 MMHG | SYSTOLIC BLOOD PRESSURE: 97 MMHG | HEART RATE: 111 BPM | RESPIRATION RATE: 18 BRPM

## 2025-05-14 DIAGNOSIS — K60.2 ANAL FISSURE: ICD-10-CM

## 2025-05-14 DIAGNOSIS — K59.01 SLOW TRANSIT CONSTIPATION: ICD-10-CM

## 2025-05-14 LAB
ABO + RH BLD: NORMAL
ALBUMIN SERPL BCG-MCNC: 4 G/DL (ref 3.5–5.2)
ALP SERPL-CCNC: 134 U/L (ref 40–150)
ALT SERPL W P-5'-P-CCNC: 28 U/L (ref 0–50)
ANION GAP SERPL CALCULATED.3IONS-SCNC: 11 MMOL/L (ref 7–15)
AST SERPL W P-5'-P-CCNC: 21 U/L (ref 0–45)
ATRIAL RATE - MUSE: 117 BPM
BASOPHILS # BLD AUTO: 0 10E3/UL (ref 0–0.2)
BASOPHILS NFR BLD AUTO: 0 %
BILIRUB SERPL-MCNC: 0.3 MG/DL
BLD GP AB SCN SERPL QL: NEGATIVE
BUN SERPL-MCNC: 6.1 MG/DL (ref 6–20)
CALCIUM SERPL-MCNC: 9.2 MG/DL (ref 8.8–10.4)
CHLORIDE SERPL-SCNC: 102 MMOL/L (ref 98–107)
CREAT SERPL-MCNC: 0.82 MG/DL (ref 0.51–0.95)
DIASTOLIC BLOOD PRESSURE - MUSE: NORMAL MMHG
EGFRCR SERPLBLD CKD-EPI 2021: >90 ML/MIN/1.73M2
EOSINOPHIL # BLD AUTO: 0.2 10E3/UL (ref 0–0.7)
EOSINOPHIL NFR BLD AUTO: 2 %
ERYTHROCYTE [DISTWIDTH] IN BLOOD BY AUTOMATED COUNT: 13 % (ref 10–15)
GLUCOSE SERPL-MCNC: 110 MG/DL (ref 70–99)
HCO3 SERPL-SCNC: 25 MMOL/L (ref 22–29)
HCT VFR BLD AUTO: 39.5 % (ref 35–47)
HGB BLD-MCNC: 12.8 G/DL (ref 11.7–15.7)
IMM GRANULOCYTES # BLD: 0 10E3/UL
IMM GRANULOCYTES NFR BLD: 1 %
INR PPP: 1.04 (ref 0.85–1.15)
INTERPRETATION ECG - MUSE: NORMAL
LYMPHOCYTES # BLD AUTO: 1.2 10E3/UL (ref 0.8–5.3)
LYMPHOCYTES NFR BLD AUTO: 14 %
MCH RBC QN AUTO: 28.7 PG (ref 26.5–33)
MCHC RBC AUTO-ENTMCNC: 32.4 G/DL (ref 31.5–36.5)
MCV RBC AUTO: 89 FL (ref 78–100)
MONOCYTES # BLD AUTO: 0.5 10E3/UL (ref 0–1.3)
MONOCYTES NFR BLD AUTO: 6 %
NEUTROPHILS # BLD AUTO: 6.6 10E3/UL (ref 1.6–8.3)
NEUTROPHILS NFR BLD AUTO: 77 %
NRBC # BLD AUTO: 0 10E3/UL
NRBC BLD AUTO-RTO: 0 /100
P AXIS - MUSE: 62 DEGREES
PLATELET # BLD AUTO: 255 10E3/UL (ref 150–450)
POTASSIUM SERPL-SCNC: 3.8 MMOL/L (ref 3.4–5.3)
PR INTERVAL - MUSE: 162 MS
PROT SERPL-MCNC: 7.6 G/DL (ref 6.4–8.3)
PROTHROMBIN TIME: 13.4 SECONDS (ref 11.8–14.8)
QRS DURATION - MUSE: 92 MS
QT - MUSE: 328 MS
QTC - MUSE: 457 MS
R AXIS - MUSE: 69 DEGREES
RBC # BLD AUTO: 4.46 10E6/UL (ref 3.8–5.2)
SODIUM SERPL-SCNC: 138 MMOL/L (ref 135–145)
SPECIMEN EXP DATE BLD: NORMAL
SYSTOLIC BLOOD PRESSURE - MUSE: NORMAL MMHG
T AXIS - MUSE: 11 DEGREES
VENTRICULAR RATE- MUSE: 117 BPM
WBC # BLD AUTO: 8.6 10E3/UL (ref 4–11)

## 2025-05-14 PROCEDURE — 85610 PROTHROMBIN TIME: CPT | Performed by: EMERGENCY MEDICINE

## 2025-05-14 PROCEDURE — 93005 ELECTROCARDIOGRAM TRACING: CPT

## 2025-05-14 PROCEDURE — 36415 COLL VENOUS BLD VENIPUNCTURE: CPT | Performed by: EMERGENCY MEDICINE

## 2025-05-14 PROCEDURE — 80053 COMPREHEN METABOLIC PANEL: CPT | Performed by: EMERGENCY MEDICINE

## 2025-05-14 PROCEDURE — 85025 COMPLETE CBC W/AUTO DIFF WBC: CPT | Performed by: EMERGENCY MEDICINE

## 2025-05-14 PROCEDURE — 86901 BLOOD TYPING SEROLOGIC RH(D): CPT | Performed by: EMERGENCY MEDICINE

## 2025-05-14 PROCEDURE — 99284 EMERGENCY DEPT VISIT MOD MDM: CPT

## 2025-05-14 ASSESSMENT — ACTIVITIES OF DAILY LIVING (ADL)
ADLS_ACUITY_SCORE: 41

## 2025-05-14 NOTE — ED TRIAGE NOTES
Rectal bleeding started 3-4 weeks ago. Painful with both urination and defecation. Denies abdominal pain.

## 2025-05-15 NOTE — ED PROVIDER NOTES
Emergency Department Note      History of Present Illness     Chief Complaint   Rectal Bleeding      HPI   Shaylee Mesa is a 30 year old female with a history of polysubstance abuse who presents to the ED for rectal bleeding. The patient reports 3-4 weeks of rectal bleeding. She has also had shooting rectal pains when urinating and defecating. Her stools have been hard. She has tried stool softeners, but this has only helped intermittently. She states she decided to call the nurse line tonight due to increasing rectal pain, and they advised her to be seen in the ED. Patient denies any abdominal pain or fevers.     Independent Historian   None    Review of External Notes   None    Past Medical History     Medical History and Problem List   ADHD  Asperger's disorder  Bipolar 1 disorder  Chemical dependency  Depressive disorder  Adjustment disorder with depressed mood  Morbid obesity  Premenstrual dysphoric disorder  Alcohol abuse  Cocaine abuse  Mild intellectual disability  Borderline personality disorder  Cannabis abuse  Gabapentin overdose  PTSD    Medications   Prozac  Flagyl  Wegovy   Abilify maintena  Implanon/Nexplanon  Vyvanse     Surgical History   Past Surgical History:   Procedure Laterality Date    EXAM UNDER ANESTHESIA, RESTORATIONS, EXTRACTION(S) DENTAL COMPLEX, COMBINED N/A 4/28/2017    Procedure: COMBINED EXAM UNDER ANESTHESIA, RESTORATIONS, EXTRACTION(S) DENTAL COMPLEX;  Dental Exam Under Anesthesia, X-Rays, Restorations x 15, Extractions x 2 , Root Canal x 3 ,Flouride Varnish in the Mouth, dental cleaning, one crown recemented ;  Surgeon: Lea Hopson DDS;  Location: [facility] OR    NO HISTORY OF SURGERY         Physical Exam     Patient Vitals for the past 24 hrs:   BP Temp Pulse Resp SpO2   05/14/25 2020 97/76 -- 111 18 98 %   05/14/25 1829 130/82 -- -- -- --   05/14/25 1826 -- 97.9  F (36.6  C) (!) 122 18 98 %     Physical Exam  Nursing note and vitals reviewed.    Constitutional:  Appears  comfortable.        GI:    Soft. No distension and no mass. No tenderness.   /Rectal:  No evidence of hemorrhoid or abscess.   Tenderness around the anus.   Evidence of anal fissure.  No bleeding at this time.  Neurological:   Alert and oriented. No focal weakness.  Skin:    Skin is warm and dry. No rash noted.      No erythema. No lesions.  Psychiatric:   Behavior is normal. Appropriate mood and affect.     Judgment and thought content normal.     Diagnostics     Lab Results   Labs Ordered and Resulted from Time of ED Arrival to Time of ED Departure   COMPREHENSIVE METABOLIC PANEL - Abnormal       Result Value    Sodium 138      Potassium 3.8      Carbon Dioxide (CO2) 25      Anion Gap 11      Urea Nitrogen 6.1      Creatinine 0.82      GFR Estimate >90      Calcium 9.2      Chloride 102      Glucose 110 (*)     Alkaline Phosphatase 134      AST 21      ALT 28      Protein Total 7.6      Albumin 4.0      Bilirubin Total 0.3     INR - Normal    INR 1.04      PT 13.4     CBC WITH PLATELETS AND DIFFERENTIAL    WBC Count 8.6      RBC Count 4.46      Hemoglobin 12.8      Hematocrit 39.5      MCV 89      MCH 28.7      MCHC 32.4      RDW 13.0      Platelet Count 255      % Neutrophils 77      % Lymphocytes 14      % Monocytes 6      % Eosinophils 2      % Basophils 0      % Immature Granulocytes 1      NRBCs per 100 WBC 0      Absolute Neutrophils 6.6      Absolute Lymphocytes 1.2      Absolute Monocytes 0.5      Absolute Eosinophils 0.2      Absolute Basophils 0.0      Absolute Immature Granulocytes 0.0      Absolute NRBCs 0.0     TYPE AND SCREEN, ADULT    ABO/RH(D) O POS      Antibody Screen Negative      SPECIMEN EXPIRATION DATE [id number]     ABO/RH TYPE AND SCREEN       Imaging   No orders to display       EKG   ECG taken at 1833, ECG read at 1843  Sinus tachycardia  Possible lateral infarct, age undetermined  Cannot rule out inferior infarct, age undetermined  Abnormal ECG   Rate 117 bpm. MD interval 162 ms.  QRS duration 92 ms. QT/QTc 328/457 ms. P-R-T axes 62 69 11.    Independent Interpretation   None    ED Course      Medications Administered   Medications - No data to display    Procedures   Procedures     Discussion of Management   None    ED Course   ED Course as of 05/14/25 2250   Wed May 14, 2025   2128 I obtained history and examined the patient as noted above.     2242 I performed anal exam.   2250 I rechecked the patient for discharge.       Additional Documentation  None    Medical Decision Making / Diagnosis     CMS Diagnoses: None    MIPS   None               MDM   Shaylee Mesa is a 30 year old female who comes in with pain in her anal area when she has stool.  Exam reveals evidence of a recent anal fissure.  There was no hemorrhoid tissue.  The patient says her stools are very hard.  I am recommending that she get on MiraLAX and a stool softener and some hydrocortisone cream to help the fissure heal.  Her hemoglobin is actually improved from the last 1 and the rest of her labs are normal.  She will follow-up in the clinic in the next week for recheck both regarding her constipation and her anal fissure.    Push fluids, get on MiraLAX once a day,  a stool softener such as senna S and take it 1-2 times a day, you could  some 1% hydrocortisone and put it around the anal area twice a day for 1 week.  Recommend you follow-up with your doctor in 1 week for a recheck.    Disposition   The patient was discharged.     Diagnosis     ICD-10-CM    1. Anal fissure  K60.2       2. Slow transit constipation  K59.01            Discharge Medications   New Prescriptions    No medications on file         Scribe Disclosure:  I, Lorena Kent, am serving as a scribe at 9:38 PM on 5/14/2025 to document services personally performed by Jory Rodriguez MD based on my observations and the provider's statements to me.        Jory Rodriguez MD  05/15/25 2002

## 2025-05-15 NOTE — DISCHARGE INSTRUCTIONS
Push fluids, get on MiraLAX once a day,  a stool softener such as senna S and take it 1-2 times a day, you could  some 1% hydrocortisone and put it around the anal area twice a day for 1 week.  Recommend you follow-up with your doctor in 1 week for a recheck.

## 2025-06-15 ENCOUNTER — OFFICE VISIT (OUTPATIENT)
Dept: URGENT CARE | Facility: URGENT CARE | Age: 30
End: 2025-06-15
Payer: COMMERCIAL

## 2025-06-15 ENCOUNTER — NURSE TRIAGE (OUTPATIENT)
Dept: NURSING | Facility: CLINIC | Age: 30
End: 2025-06-15
Payer: COMMERCIAL

## 2025-06-15 VITALS
DIASTOLIC BLOOD PRESSURE: 75 MMHG | TEMPERATURE: 98.4 F | WEIGHT: 293 LBS | RESPIRATION RATE: 22 BRPM | SYSTOLIC BLOOD PRESSURE: 114 MMHG | HEART RATE: 101 BPM | BODY MASS INDEX: 55.17 KG/M2 | OXYGEN SATURATION: 96 %

## 2025-06-15 DIAGNOSIS — R30.0 DYSURIA: Primary | ICD-10-CM

## 2025-06-15 DIAGNOSIS — F31.70 BIPOLAR AFFECTIVE DISORDER IN REMISSION: ICD-10-CM

## 2025-06-15 DIAGNOSIS — R82.998 LEUKOCYTES IN URINE: ICD-10-CM

## 2025-06-15 LAB
ALBUMIN UR-MCNC: ABNORMAL MG/DL
APPEARANCE UR: CLEAR
BACTERIA #/AREA URNS HPF: ABNORMAL /HPF
BILIRUB UR QL STRIP: ABNORMAL
COLOR UR AUTO: YELLOW
GLUCOSE UR STRIP-MCNC: NEGATIVE MG/DL
HGB UR QL STRIP: ABNORMAL
KETONES UR STRIP-MCNC: NEGATIVE MG/DL
LEUKOCYTE ESTERASE UR QL STRIP: ABNORMAL
NITRATE UR QL: NEGATIVE
PH UR STRIP: 6 [PH] (ref 5–8)
RBC #/AREA URNS AUTO: ABNORMAL /HPF
SP GR UR STRIP: 1.01 (ref 1–1.03)
SQUAMOUS #/AREA URNS AUTO: ABNORMAL /LPF
UROBILINOGEN UR STRIP-ACNC: 2 E.U./DL
WBC #/AREA URNS AUTO: ABNORMAL /HPF

## 2025-06-15 PROCEDURE — 87086 URINE CULTURE/COLONY COUNT: CPT | Performed by: NURSE PRACTITIONER

## 2025-06-15 PROCEDURE — 99203 OFFICE O/P NEW LOW 30 MIN: CPT | Performed by: NURSE PRACTITIONER

## 2025-06-15 PROCEDURE — 81001 URINALYSIS AUTO W/SCOPE: CPT | Performed by: NURSE PRACTITIONER

## 2025-06-15 PROCEDURE — 3074F SYST BP LT 130 MM HG: CPT | Performed by: NURSE PRACTITIONER

## 2025-06-15 PROCEDURE — 3078F DIAST BP <80 MM HG: CPT | Performed by: NURSE PRACTITIONER

## 2025-06-15 RX ORDER — NITROFURANTOIN 25; 75 MG/1; MG/1
100 CAPSULE ORAL 2 TIMES DAILY
Qty: 10 CAPSULE | Refills: 0 | Status: SHIPPED | OUTPATIENT
Start: 2025-06-15 | End: 2025-06-20

## 2025-06-15 RX ORDER — FLUOXETINE HYDROCHLORIDE 40 MG/1
40 CAPSULE ORAL DAILY
COMMUNITY
Start: 2025-05-21

## 2025-06-15 NOTE — TELEPHONE ENCOUNTER
Nurse Triage SBAR    Is this a 2nd Level Triage? NO    Situation:  Multiple symptoms    Background: Pt and her mom on the phone. Reporting pt is not feeling well and they're unsure if is is due to her recent change in dose for her Fluoxetine, from the Wegovy, or if it is something else. Reports symptoms started a few days ago.    Assessment:  Reports feeling nauseated today stating she has thrown up a few times. Reports being a little lightheaded as well. Pt states her hurts both when she has a BM and when she voids. Reports she was seen in the ED on 5/14 for an anal fissure so that may be what is causing the discomfort with BM's. Pt reports burning with urination and states her urine is a brownish color with a foul odor.    Protocol Recommended Disposition:   See PCP Within 24 Hours    Recommendation:  PCP or [facility] within 24 hrs. Pt's mother plans to bring pt in to the [facility] now for the urinary symptoms and will discuss the possible Wegovy side effects and if pt should move forward with her shot today or follow up with PCP tomorrow. Protocol and care advice reviewed. Advised to call back with any new or worsening signs, symptoms, concerns, or questions. They verbalized understanding and agreed to follow advice given.    Reason for Disposition   Bad or foul-smelling urine    Additional Information   Negative: Shock suspected (e.g., cold/pale/clammy skin, too weak to stand, low BP, rapid pulse)   Negative: Sounds like a life-threatening emergency to the triager   Negative: [1] Unable to urinate (or only a few drops) > 4 hours AND [2] bladder feels very full (e.g., palpable bladder or strong urge to urinate)   Negative: Patient sounds very sick or weak to the triager   Negative: [1] Decreased urination and [2] drinking very little AND [2] dehydration suspected (e.g., dark urine, no urine > 12 hours, very dry mouth, very lightheaded)   Negative: Fever > 100.4 F (38.0 C)   Negative: Side (flank) or lower back pain present    Negative: Urinating more frequently than usual (i.e., frequency)    Protocols used: Urinary Symptoms-A-AH

## 2025-06-15 NOTE — PROGRESS NOTES
Urgent Care Clinic Visit  Assessment & Plan     Assessment & Plan  Dysuria:  - Possible urinary tract infection (UTI) indicated by symptoms and urinalysis results.  - Prescribe Macrobid for treatment. Urine culture results pending for confirmation.    Leukocytes in urine:  - Presence of leukocytes suggests possible UTI, but contamination during sample collection is also considered.  - Monitor urine culture results to confirm diagnosis.    Bipolar affective disorder in remission:  - no acute issues reported.  - Continue current medication regimen. Follow up with psychiatrist routinely             No follow-ups on file.    DONN [employer]    Maria Luz Ca is a 30 year old female who presents to clinic today for the following health issues:  Chief Complaint   Patient presents with    Dizziness     Dizziness and nausea for 1 week. Bodyache. Vomit today. Can not keep food down.    Urinary Problem     Brown urine. Frequency. Urgency. Some pain when urinating.         6/15/2025     6:34 PM   Additional Questions   Roomed by Terence Dawson MA   Accompanied by Mother     HPI        History of Present Illness-  Lanny Mesa, 30 years, female    - Feeling nauseous at times.   - Burning sensation during urination  - Diarrhea occurred yesterday, not today  - Coughing intermittently  - Decreased appetite since starting Wegovy, which began a month ago. No epigastric pain, no burping, no abdomen pain that radiates to the back, no flank pain.   - Experienced tiredness after psychiatrist increased medication dose to 40 mg this week, reverted to 20 mg  - Vomited this morning, but able to drink water  - No fever reported  - History of anal fissure noted in May        Review of Systems        Objective    /75   Pulse 101   Temp 98.4  F (36.9  C) (Oral)   Resp 22   Wt (!) 169.5 kg (373 lb 9.6 oz)   SpO2 96%   BMI 55.17 kg/m    Physical Exam   GENERAL: alert and no  distress  ABDOMEN: soft, nontender, no hepatosplenomegaly, no masses and bowel sounds normal  MS: no gross musculoskeletal defects noted, no edema  SKIN: no suspicious lesions or rashes    Results for orders placed or performed in visit on 06/15/25 (from the past 24 hours)   UA Macroscopic with reflex to Microscopic and Culture - Clinic Collect    Specimen: Urine, Clean Catch   Result Value Ref Range    Color Urine Yellow Colorless, Straw, Light Yellow, Yellow    Appearance Urine Clear Clear    Glucose Urine Negative Negative mg/dL    Bilirubin Urine Small (A) Negative    Ketones Urine Negative Negative mg/dL    Specific Gravity Urine 1.010 1.005 - 1.030    Blood Urine Trace (A) Negative    pH Urine 6.0 5.0 - 8.0    Protein Albumin Urine Trace (A) Negative mg/dL    Urobilinogen Urine 2.0 (A) 0.2, 1.0 E.U./dL    Nitrite Urine Negative Negative    Leukocyte Esterase Urine Large (A) Negative   Urine Microscopic Exam   Result Value Ref Range    Bacteria Urine None Seen None Seen /HPF    RBC Urine 5-10 (A) 0-2 /HPF /HPF    WBC Urine 10-25 (A) 0-5 /HPF /HPF    Squamous Epithelials Urine Many (A) None Seen /LPF         Chief Complaint   Patient presents with    Dizziness     Dizziness and nausea for 1 week. Bodyache. Vomit today. Can not keep food down.    Urinary Problem     Brown urine. Frequency. Urgency. Some pain when urinating.               6/15/2025     6:34 PM   Additional Questions   Roomed by Terence Dawson MA   Accompanied by Mother     Pre-Provider Visit Orders- Urinalysis UA/UC  Patient reports the following symptoms:  possible urinary tract infection (UTI) , possible bladder infection , discomfort, pain or burning with urination , and frequent urination   Does the patient report any of the following symptoms: vaginal discharge, vaginal itching, possible yeast infection, has a urinary catheter in place, or unable to void in a specimen cup?  No            Terence Dawson MA on 6/15/2025 at 6:34 PM

## 2025-06-18 LAB — BACTERIA UR CULT: NORMAL

## 2025-08-15 ENCOUNTER — OFFICE VISIT (OUTPATIENT)
Dept: URGENT CARE | Facility: URGENT CARE | Age: 30
End: 2025-08-15
Payer: COMMERCIAL

## 2025-08-15 VITALS
BODY MASS INDEX: 52.66 KG/M2 | HEART RATE: 116 BPM | WEIGHT: 293 LBS | DIASTOLIC BLOOD PRESSURE: 71 MMHG | SYSTOLIC BLOOD PRESSURE: 100 MMHG | OXYGEN SATURATION: 94 % | TEMPERATURE: 98.5 F | RESPIRATION RATE: 16 BRPM

## 2025-08-15 DIAGNOSIS — L01.00 IMPETIGO: ICD-10-CM

## 2025-08-15 DIAGNOSIS — H10.9 BACTERIAL CONJUNCTIVITIS OF BOTH EYES: Primary | ICD-10-CM

## 2025-08-15 DIAGNOSIS — B96.89 BACTERIAL CONJUNCTIVITIS OF BOTH EYES: Primary | ICD-10-CM

## 2025-08-15 PROCEDURE — 3078F DIAST BP <80 MM HG: CPT | Performed by: STUDENT IN AN ORGANIZED HEALTH CARE EDUCATION/TRAINING PROGRAM

## 2025-08-15 PROCEDURE — 3074F SYST BP LT 130 MM HG: CPT | Performed by: STUDENT IN AN ORGANIZED HEALTH CARE EDUCATION/TRAINING PROGRAM

## 2025-08-15 PROCEDURE — 99213 OFFICE O/P EST LOW 20 MIN: CPT | Performed by: STUDENT IN AN ORGANIZED HEALTH CARE EDUCATION/TRAINING PROGRAM

## 2025-08-15 RX ORDER — MUPIROCIN 2 %
OINTMENT (GRAM) TOPICAL 3 TIMES DAILY
Qty: 15 G | Refills: 0 | Status: SHIPPED | OUTPATIENT
Start: 2025-08-15 | End: 2025-08-20

## 2025-08-15 RX ORDER — POLYMYXIN B SULFATE AND TRIMETHOPRIM 1; 10000 MG/ML; [USP'U]/ML
2 SOLUTION OPHTHALMIC EVERY 6 HOURS
Qty: 10 ML | Refills: 0 | Status: SHIPPED | OUTPATIENT
Start: 2025-08-15 | End: 2025-08-20

## (undated) DEVICE — LINEN ORTHO PACK 5446

## (undated) DEVICE — TUBING SUCTION MEDI-VAC 1/4"X20' N620A

## (undated) DEVICE — RX BACITRACIN OINTMENT 0.9G 1/32OZ 01680 11109

## (undated) DEVICE — GLOVE PROTEXIS POWDER FREE 7.0 ORTHOPEDIC 2D73ET70

## (undated) DEVICE — PACK SET-UP STD 9102

## (undated) DEVICE — TOOTHBRUSH ADULT NON STERILE MDS136850

## (undated) DEVICE — ANTIFOG SOLUTION W/FOAM PAD 31142527

## (undated) DEVICE — GOWN XLG DISP 9545

## (undated) DEVICE — SUCTION TIP YANKAUER W/O VENT K86

## (undated) DEVICE — SPONGE PACK THROAT 2X18" 31-708

## (undated) DEVICE — SOL WATER IRRIG 1000ML BOTTLE 2F7114

## (undated) DEVICE — CATH TRAY FOLEY SURESTEP 16FR WDRAIN BAG STLK LATEX A300316A

## (undated) DEVICE — SOL NACL 0.9% IRRIG 1000ML BOTTLE 2F7124

## (undated) DEVICE — GLOVE RADIATION RESISTANT SZ 7  95-394

## (undated) DEVICE — SPONGE SURGIFOAM 12 1972

## (undated) DEVICE — DRAPE MAYO STAND 23X54 8337

## (undated) DEVICE — SPECIMEN CONTAINER 5OZ STERILE 2600SA

## (undated) DEVICE — COVER PROBE ULTRASOUND 3D W/GEL 5X96" LF 20-P3D596

## (undated) DEVICE — SPONGE RAY-TEC 4X4" 7317

## (undated) DEVICE — STRAP KNEE/BODY 31143004

## (undated) DEVICE — BRUSH SURGICAL SCRUB PLAIN STERILE 4454A

## (undated) RX ORDER — FENTANYL CITRATE 50 UG/ML
INJECTION, SOLUTION INTRAMUSCULAR; INTRAVENOUS
Status: DISPENSED
Start: 2017-04-28

## (undated) RX ORDER — CHLORHEXIDINE GLUCONATE ORAL RINSE 1.2 MG/ML
SOLUTION DENTAL
Status: DISPENSED
Start: 2017-04-28

## (undated) RX ORDER — OXYMETAZOLINE HYDROCHLORIDE 0.05 G/100ML
SPRAY NASAL
Status: DISPENSED
Start: 2017-04-28

## (undated) RX ORDER — MIDAZOLAM HYDROCHLORIDE 2 MG/ML
SYRUP ORAL
Status: DISPENSED
Start: 2017-04-28

## (undated) RX ORDER — KETAMINE HYDROCHLORIDE 100 MG/ML
INJECTION, SOLUTION INTRAMUSCULAR; INTRAVENOUS
Status: DISPENSED
Start: 2017-04-28

## (undated) RX ORDER — ONDANSETRON 2 MG/ML
INJECTION INTRAMUSCULAR; INTRAVENOUS
Status: DISPENSED
Start: 2017-04-28